# Patient Record
Sex: MALE | Race: WHITE | NOT HISPANIC OR LATINO | Employment: OTHER | URBAN - METROPOLITAN AREA
[De-identification: names, ages, dates, MRNs, and addresses within clinical notes are randomized per-mention and may not be internally consistent; named-entity substitution may affect disease eponyms.]

---

## 2023-02-23 LAB — PROSTATE SPECIFIC ANTIGEN,SCREEN: 5.53 NG/ML (ref 0–4)

## 2023-03-15 LAB — URINE CULTURE: NO GROWTH

## 2023-04-14 LAB — URINE CULTURE: NORMAL

## 2023-04-28 LAB
ANION GAP IN SER/PLAS: 13 MMOL/L (ref 10–20)
CALCIUM (MG/DL) IN SER/PLAS: 9.7 MG/DL (ref 8.6–10.6)
CARBON DIOXIDE, TOTAL (MMOL/L) IN SER/PLAS: 26 MMOL/L (ref 21–32)
CHLORIDE (MMOL/L) IN SER/PLAS: 102 MMOL/L (ref 98–107)
CREATININE (MG/DL) IN SER/PLAS: 0.99 MG/DL (ref 0.5–1.3)
ERYTHROCYTE DISTRIBUTION WIDTH (RATIO) BY AUTOMATED COUNT: 13.2 % (ref 11.5–14.5)
ERYTHROCYTE MEAN CORPUSCULAR HEMOGLOBIN CONCENTRATION (G/DL) BY AUTOMATED: 33 G/DL (ref 32–36)
ERYTHROCYTE MEAN CORPUSCULAR VOLUME (FL) BY AUTOMATED COUNT: 87 FL (ref 80–100)
ERYTHROCYTES (10*6/UL) IN BLOOD BY AUTOMATED COUNT: 5.55 X10E12/L (ref 4.5–5.9)
GFR MALE: 87 ML/MIN/1.73M2
GLUCOSE (MG/DL) IN SER/PLAS: 112 MG/DL (ref 74–99)
HEMATOCRIT (%) IN BLOOD BY AUTOMATED COUNT: 48.2 % (ref 41–52)
HEMOGLOBIN (G/DL) IN BLOOD: 15.9 G/DL (ref 13.5–17.5)
INR IN PPP BY COAGULATION ASSAY: 1 (ref 0.9–1.1)
LEUKOCYTES (10*3/UL) IN BLOOD BY AUTOMATED COUNT: 5.4 X10E9/L (ref 4.4–11.3)
NRBC (PER 100 WBCS) BY AUTOMATED COUNT: 0 /100 WBC (ref 0–0)
PLATELETS (10*3/UL) IN BLOOD AUTOMATED COUNT: 193 X10E9/L (ref 150–450)
POTASSIUM (MMOL/L) IN SER/PLAS: 4.4 MMOL/L (ref 3.5–5.3)
PROTHROMBIN TIME (PT) IN PPP BY COAGULATION ASSAY: 11.2 SEC (ref 9.8–13.4)
SODIUM (MMOL/L) IN SER/PLAS: 137 MMOL/L (ref 136–145)
UREA NITROGEN (MG/DL) IN SER/PLAS: 12 MG/DL (ref 6–23)

## 2023-09-18 LAB
ANION GAP IN SER/PLAS: 15 MMOL/L (ref 10–20)
CALCIUM (MG/DL) IN SER/PLAS: 10.2 MG/DL (ref 8.6–10.6)
CARBON DIOXIDE, TOTAL (MMOL/L) IN SER/PLAS: 27 MMOL/L (ref 21–32)
CHLORIDE (MMOL/L) IN SER/PLAS: 103 MMOL/L (ref 98–107)
CREATININE (MG/DL) IN SER/PLAS: 0.93 MG/DL (ref 0.5–1.3)
ERYTHROCYTE DISTRIBUTION WIDTH (RATIO) BY AUTOMATED COUNT: 13 % (ref 11.5–14.5)
ERYTHROCYTE MEAN CORPUSCULAR HEMOGLOBIN CONCENTRATION (G/DL) BY AUTOMATED: 32.8 G/DL (ref 32–36)
ERYTHROCYTE MEAN CORPUSCULAR VOLUME (FL) BY AUTOMATED COUNT: 88 FL (ref 80–100)
ERYTHROCYTES (10*6/UL) IN BLOOD BY AUTOMATED COUNT: 5.36 X10E12/L (ref 4.5–5.9)
GFR MALE: >90 ML/MIN/1.73M2
GLUCOSE (MG/DL) IN SER/PLAS: 104 MG/DL (ref 74–99)
HEMATOCRIT (%) IN BLOOD BY AUTOMATED COUNT: 47 % (ref 41–52)
HEMOGLOBIN (G/DL) IN BLOOD: 15.4 G/DL (ref 13.5–17.5)
LEUKOCYTES (10*3/UL) IN BLOOD BY AUTOMATED COUNT: 6.1 X10E9/L (ref 4.4–11.3)
NRBC (PER 100 WBCS) BY AUTOMATED COUNT: 0 /100 WBC (ref 0–0)
PLATELETS (10*3/UL) IN BLOOD AUTOMATED COUNT: 199 X10E9/L (ref 150–450)
POTASSIUM (MMOL/L) IN SER/PLAS: 4.5 MMOL/L (ref 3.5–5.3)
SODIUM (MMOL/L) IN SER/PLAS: 140 MMOL/L (ref 136–145)
UREA NITROGEN (MG/DL) IN SER/PLAS: 14 MG/DL (ref 6–23)

## 2023-09-26 ENCOUNTER — HOSPITAL ENCOUNTER (OUTPATIENT)
Dept: DATA CONVERSION | Facility: HOSPITAL | Age: 63
End: 2023-09-27
Attending: STUDENT IN AN ORGANIZED HEALTH CARE EDUCATION/TRAINING PROGRAM | Admitting: STUDENT IN AN ORGANIZED HEALTH CARE EDUCATION/TRAINING PROGRAM
Payer: COMMERCIAL

## 2023-09-26 DIAGNOSIS — C61 MALIGNANT NEOPLASM OF PROSTATE (MULTI): ICD-10-CM

## 2023-09-27 LAB
ANION GAP IN SER/PLAS: 12 MMOL/L (ref 10–20)
CALCIUM (MG/DL) IN SER/PLAS: 8.6 MG/DL (ref 8.6–10.6)
CARBON DIOXIDE, TOTAL (MMOL/L) IN SER/PLAS: 27 MMOL/L (ref 21–32)
CHLORIDE (MMOL/L) IN SER/PLAS: 104 MMOL/L (ref 98–107)
CREATININE (MG/DL) IN SER/PLAS: 0.97 MG/DL (ref 0.5–1.3)
ERYTHROCYTE DISTRIBUTION WIDTH (RATIO) BY AUTOMATED COUNT: 13 % (ref 11.5–14.5)
ERYTHROCYTE DISTRIBUTION WIDTH (RATIO) BY AUTOMATED COUNT: NORMAL
ERYTHROCYTE MEAN CORPUSCULAR HEMOGLOBIN CONCENTRATION (G/DL) BY AUTOMATED: 34.4 G/DL (ref 32–36)
ERYTHROCYTE MEAN CORPUSCULAR HEMOGLOBIN CONCENTRATION (G/DL) BY AUTOMATED: NORMAL
ERYTHROCYTE MEAN CORPUSCULAR VOLUME (FL) BY AUTOMATED COUNT: 86 FL (ref 80–100)
ERYTHROCYTE MEAN CORPUSCULAR VOLUME (FL) BY AUTOMATED COUNT: NORMAL
ERYTHROCYTES (10*6/UL) IN BLOOD BY AUTOMATED COUNT: 4.68 X10E12/L (ref 4.5–5.9)
ERYTHROCYTES (10*6/UL) IN BLOOD BY AUTOMATED COUNT: NORMAL
GFR MALE: 89 ML/MIN/1.73M2
GLUCOSE (MG/DL) IN SER/PLAS: 99 MG/DL (ref 74–99)
HEMATOCRIT (%) IN BLOOD BY AUTOMATED COUNT: 40.1 % (ref 41–52)
HEMATOCRIT (%) IN BLOOD BY AUTOMATED COUNT: NORMAL
HEMOGLOBIN (G/DL) IN BLOOD: 13.8 G/DL (ref 13.5–17.5)
HEMOGLOBIN (G/DL) IN BLOOD: NORMAL
LEUKOCYTES (10*3/UL) IN BLOOD BY AUTOMATED COUNT: 11.1 X10E9/L (ref 4.4–11.3)
LEUKOCYTES (10*3/UL) IN BLOOD BY AUTOMATED COUNT: NORMAL
NRBC (PER 100 WBCS) BY AUTOMATED COUNT: 0 /100 WBC (ref 0–0)
NRBC (PER 100 WBCS) BY AUTOMATED COUNT: NORMAL
PLATELETS (10*3/UL) IN BLOOD AUTOMATED COUNT: 199 X10E9/L (ref 150–450)
PLATELETS (10*3/UL) IN BLOOD AUTOMATED COUNT: NORMAL
POTASSIUM (MMOL/L) IN SER/PLAS: 4 MMOL/L (ref 3.5–5.3)
SODIUM (MMOL/L) IN SER/PLAS: 139 MMOL/L (ref 136–145)
UREA NITROGEN (MG/DL) IN SER/PLAS: 12 MG/DL (ref 6–23)

## 2023-09-28 PROBLEM — S83.289A TEAR OF LATERAL MENISCUS OF KNEE: Status: ACTIVE | Noted: 2023-09-28

## 2023-09-28 PROBLEM — E78.5 DYSLIPIDEMIA: Status: ACTIVE | Noted: 2023-09-28

## 2023-09-28 PROBLEM — N45.1 EPIDIDYMITIS: Status: ACTIVE | Noted: 2023-09-28

## 2023-09-28 PROBLEM — M23.007 CYST OF MENISCUS OF LEFT KNEE: Status: ACTIVE | Noted: 2023-09-28

## 2023-09-28 PROBLEM — R97.20 ELEVATED PSA: Status: ACTIVE | Noted: 2023-09-28

## 2023-09-28 PROBLEM — M76.32 ILIOTIBIAL BAND TENDINITIS OF LEFT SIDE: Status: ACTIVE | Noted: 2023-09-28

## 2023-09-28 PROBLEM — I10 ESSENTIAL HYPERTENSION, BENIGN: Status: ACTIVE | Noted: 2023-09-28

## 2023-09-28 PROBLEM — S76.312A LEFT HAMSTRING MUSCLE STRAIN: Status: ACTIVE | Noted: 2023-09-28

## 2023-09-28 RX ORDER — TADALAFIL 5 MG/1
5 TABLET ORAL DAILY
COMMUNITY
End: 2023-10-03 | Stop reason: DRUGHIGH

## 2023-09-28 RX ORDER — AMLODIPINE BESYLATE 5 MG/1
1 TABLET ORAL DAILY
COMMUNITY
Start: 2022-02-07 | End: 2024-01-26 | Stop reason: ALTCHOICE

## 2023-09-28 RX ORDER — LOSARTAN POTASSIUM 25 MG/1
25 TABLET ORAL DAILY
COMMUNITY
Start: 2023-07-18 | End: 2024-01-26

## 2023-09-28 RX ORDER — CHLORTHALIDONE 25 MG/1
1 TABLET ORAL DAILY
COMMUNITY
Start: 2021-12-20 | End: 2024-01-26 | Stop reason: ALTCHOICE

## 2023-09-28 RX ORDER — EPINEPHRINE 0.3 MG/.3ML
0.3 INJECTION SUBCUTANEOUS ONCE AS NEEDED
COMMUNITY
Start: 2019-09-03

## 2023-09-29 VITALS — BODY MASS INDEX: 27.99 KG/M2 | HEIGHT: 71 IN | WEIGHT: 199.96 LBS

## 2023-09-30 NOTE — DISCHARGE SUMMARY
Send Summary:   Discharge Summary Providers:  Provider Role Provider Name   · Referring Cheryl Richter   · Attending Vinicio Robles   · Primary Fabio Marquez       Note Recipients: Fabio Marquez MD - [id number] [preferred]  Vinicio Robles MD       Discharge:    Summary:   Admission Date: .26-Sep-2023 05:34:00   Discharge Date: 27-Sep-2023   Admission Reason: Adenocarcinoma of prostate   Final Discharge Diagnoses: Adenocarcinoma of prostate   Procedures: Date: 26-Sep-2023 13:15:00  Procedure Name: 1. robotic assisted laparoscopic radical prostatectomy  2. bilateral pelvic lymph node dissection  3. laparoscopic urethral suspension   Vital Signs:        T   P  R  BP   MAP  SpO2   Value  37.3  88  18  131/72      95%  Date/Time 9/26 21:42 9/26 21:42 9/26 21:42 9/26 21:42    9/26 21:42  Range  (36.7C - 37.3C )  (75 - 88 )  (18 - 18 )  (131 - 132 )/ (72 - 75 )    (94% - 95% )  Highest temp of 37.3 C was recorded at 9/26 21:42    Date:            Weight/Scale Type:  Height:   26-Sep-2023 18:49  90.7  kg / standing  180.1  cm  Physical Exam:    Physical exam:  Constitutional: Alert, NAD  HEENT: normocephalic, atraumatic EOMI  Neuro: A&O x3  CV: rate is regular  Pulm: Non-laboured breathing on room air  GI: abdomen soft, non-tender, non-distended  : Tony in place draining clear yellow urine  Skin: Port incisions with dermabond and steris c/d/i. No lesions or discoloration noted.  Musculoskeletal GALEN  Extremities: no edema or cyanosis noted    Hospital Course:    TORIE PENA is a 63 year old male with a significant pmh of HTN, HLD, mild JIMMY (not on CPAP), right inguinal hernia, hx of UTI and prostatitis who was  referred by Dr. Walker for elevated PSA (5.53 2/23/23) and was diagnosed with 0.9cm PIRADS4 lesion in the right posterolateral peripheral zone apex, without evidence of extraprostatic extension on 4/3 MRI. 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 6/16 PET CT  demonstrated increased expression within the apical prostate gland consistent with MRI, without evidence for pelvic lymph node or distant metastatic disease. IPSS 10/35, JIGNESH 25/25. He is now s/p RALP with BPLND with Dr. Robles on 9/26. The procedure was  well tolerated and findings were negative intra-op leak test. The patient is currently POD #1 and has had an uncomplicated surgical course. VS remained stable. Patient voided xL clear yellow urine per sheets. Labs were significant for X. ROSHAN output was  serosanguinous and <10 cc/hr for 6hrs, thus ROSHAN drain was removed. At the time of hospital discharge he was tolerating a regular diet, ambulating independently, and pain was well-controlled. The patient worked with the nursing team to attain a baseline  proficiency in sheets catheter care. Patient was not found to have impaired mobility, so no PT eval or home services were determined to be required.  Recommendations for discharge included maintaining sheets catheter until follow up office visit for TOV  on 10/3 in Urology clinic. He was discharged to home in satisfactory condition and will follow up with Dr. Robles in the outpatient clinic on 11/16.      Discharge Information:    and Continuing Care:   Lab Results - Pending:    Complete Blood Count Drawn at 27-Sep-2023 10:50:00  Surgical Pathology Drawn at 26-Sep-2023 12:13:00  Surgical Pathology Drawn at 26-Sep-2023 09:47:00  Radiology Results - Pending: None   Discharge Instructions:    Activity:           Do not drive or operate heavy machinery while taking narcotic medication or if you have discomfort that impairs your ability to make sudden movements          After discharge from the hospital you can slowly resume your activities of daily living          You  "should refrain from lifting more than 15 lbs and doing strenuous activities for 6 weeks          It is ok to shower, but avoid tubsoaks or any prolonged submersion in water          You may let soapy water run over your incisions or any surgical drains left in place at discharge but avoid vigorous scrubbing          Soap and water may also be used to clean the outside of the catheter    Nutrition/Diet:           You may resume a normal diet and make sure to drink plenty of fluids    Labs:           Other Labs:   PSA level - please have this lab work drawn 1-2 days before your follow up with Dr Robles. The order for this test is in the  [facility] - please have this blood work obtained at any  facility.    Additional Orders:           Additional Instructions:   If you have any urgent issues in your immediate post-operative period, call [phone] between 7am and 3:30pm, Monday-Friday (except holidays) to reach the inpatient Urology NP direct voicemail. If there is no response  within an hour, call [phone].     Recommend getting Miralax (polyethylene glycol) over the counter at pharmacy for assistance in return of bowel function. This is safe to use daily and you can begin tonight after discharge.  To assist with irritation of the catheter insertion site, you may apply bacitracin/Neosporin ointment frequently throughout the day. This provides a \"cushion.\"   Expect the catheter to leak - remember, the urethra has a tube keeping it open - and urine will flow down the path of least resistance, especially when engaging your abdominal muscles to move or have a bowel movement.         Discharge Medications: Home Medication   oxyCODONE 5 mg oral tablet - 1 tab(s) orally every 6 hours x 3 days  as needed for pain G89.18    12 tablets dispensed  MiraLax oral powder for reconstitution - 17 gram(s) orally once a day as needed to prevent constipation while taking narcotics  senna 8.6 mg oral tablet - 1 tab(s) orally once a day " to prevent constipation while taking pain medication  losartan 25 mg oral tablet - 1 tab(s) oral once a day     PRN Medication     DNR Status:   ·  Code Status Code Status order at time of discharge: Full Code       Electronic Signatures:  Leesa Florian (PAC)  (Signed 27-Sep-2023 00:40)   Authored: Send Summary, Summary Content, Ongoing Care,  DNR Status  Linda Martinez (APRN-CNP)  (Signed 27-Sep-2023 11:12)   Authored: Summary Content, Ongoing Care, Note Completion      Last Updated: 27-Sep-2023 11:12 by Linda Martinez (APRN-CNP)

## 2023-09-30 NOTE — H&P
History & Physical Reviewed:   I have reviewed the History and Physical dated:  18-Sep-2023   History and Physical reviewed and relevant findings noted. Patient examined to review pertinent physical  findings.: No significant changes   Home Medications Reviewed: no changes noted   Allergies Reviewed: no changes noted       ERAS (Enhanced Recovery After Surgery):  ·  ERAS Patient: no     Consent:   COVID-19 Consent:  ·  COVID-19 Risk Consent Surgeon has reviewed key risks related to the risk of ranjana COVID-19 and if they contract COVID-19 what the risks are.     Attestation:   Note Completion:  I am a:  Resident/Fellow   Attending Attestation I reviewed the resident/fellow?s documentation and discussed the patient with the resident/fellow.  I agree with the resident/fellow?s medical  decision making as documented in the note.          Electronic Signatures:  Vinicio Robles)  (Signed 27-Sep-2023 08:07)   Authored: Note Completion   Co-Signer: History & Physical Reviewed, ERAS, Consent, Note Completion  Malaika David (Resident))  (Signed 25-Sep-2023 19:52)   Authored: History & Physical Reviewed, ERAS, Consent,  Note Completion      Last Updated: 27-Sep-2023 08:07 by Vinicio Robles)

## 2023-10-01 NOTE — OP NOTE
Post Operative Note:     PreOp Diagnosis: prostate cancer   Post-Procedure Diagnosis: same   Procedure: 1. robotic assisted laparoscopic radical  prostatectomy  2. bilateral pelvic lymph node dissection  3. laparoscopic urethral suspension   Surgeon: Margaret   Resident/Fellow/Other Assistant: Lele David   Anesthesia: general   I.V. Fluids: per anesthesia   Estimated Blood Loss (mL): 200cc   Blood Replacement: none   Specimen: yes. prostate, seminal vesicles, pelvic  lymph nodes   Complications: none   Findings: small prostate, bilateral nerve sparing   Patient Returned To/Condition: PACU in stable condition   Urine Output: lost to field   Drains and/or Catheters: sheets catheter, ROSHAN drain     Operative Report Dictated:  Dictation: not applicable - note contains Operative  Report   Operative Report:    Operative Indications:  This is a 63yoM with GG3 prostate cancer who presents for robotic prostatectomy with BLLPLND with Dr. Robles. Risks, benefits, and alternatives were discussed in the preoperative setting and consent was obtained.    Operative Procedure:  The patient was brought to the operating room and placed in the supine position. Under general endotracheal anesthesia the abdominal region and groin areas were draped and prepped in the usual sterile fashion. IV antibiotics were administered. The patient  was placed in 30 degree trendelenburg position. A 16fr 10cc sheets catheter was placed  An 8mm incision was made just above the umbilicus. This was carried out through the skin and subcutaneous tissues. A veress needle was used and placed into the abdominal  cavity. The abdomen was then insufflated with CO2 to a total pressure of 15mm. At this point an 8mm trocar was placed into the abdominal cavity. The Davinci camera with 0 degree lens was placed and the abdominal cavity was examined and there were noted  to be not bowel or vascular injuries.   At this point, 3 Davinci trocars, a dilating blunt Ethicon 10/12  trocar cristina 5mm airseal were placed, totaling 6 ports in all and the TapPressinci robot was brought into place and docked securely.   Beginning with a 0 degree lens, I began by incising the median umbilical ligaments up on the anterior abdominal wall. This allowed the bladder to fall posteriorly and the space of retzius was entered. The adipose tissue was cleared overlying the endopelvic  fascia.using only sharp dissection with no electrocautery I opened the endopelvic fascia. Identification of the apex of the prostate was accomplished in this manner. The levator muscles were swept away posterolaterally and the superficial dorsal vein  was cauterized using the biopolar cautery.   We then switched to a 30-degree down lens and identified the bladder neck. Using the cautery  I divided the anterior bladder neck. I then inspected the interior aspect of the bladder and identified the UO's which were well away from our dissection and  confirmed the absence of any median lobe. The left sided assistant lifted up on the sheets catheter. I then divided the posterior bladder neck. Deep to this I then identified both vasa and seminal vesicles. The vasa were both divided and both SV's were  dissected down to their tips. Minimal cautery was utilized here to avoid injury to the NVB's at the tip of the SV's. With anterior traction on the vas and SV's  I divided the posterior layer of Denonviller's fascia and identified the yellow perirectal  fat. This allowed us to develop a plane between the prostate and rectum. In doing so, both pedicles of the prostate were identified. the pedicles were then divided and nerve sparin performed using a 30 degree up lens from beneath. The lateral prostatic  fascia was incised  and the neurovascular bundles were allowed to fall posterolaterally and a bilateral nerve sparing procedure was performed. there was one area on the r side i was uncertain about and frozen section was sent which returned negative.   Small pedicle attachments were then divided all the way from the base to the apex of the prostate. The NVB was also released at the level of the urethra and prostatic apex. The only remaining attachments at this point were the DVC and the urethra. The  DVC was then divided identifying a nice healthy urethra beneath. Using the scissors, the urethra was divided and the prostate was free. The DVC was oversewn with 0 Vicryl. The specimen was placed in an endocatch bag and tucked away for the remaining portion  of the case. The bladder neck was then reconstructed using a posterior tennis racquet approach using 0-vicryl  At this point a bilateral pelvic lymph node dissection was carried out in the standard fashion. Borders of dissection were the iliac vein anteriorly, obturator nerve posteriorly, bifurcation of iliac superiorly and coopers ligament inferiorly. Both obturator  nerves were identified and preserved. Both lymphatic packages appeared grossly benign and were sent off to pathology as a permanent specimen.  At this time, 2 needle drivers were placed and a Vicente stitch used to bring the bladder towards the urethra. Using a double armed 2.0 monocryl, the urethrovesical anastomosis was performed in a running fashion. An 18Fr sheets catheter was placed and irrigated  and there was no extravasation indicating a water tight anastomosis. The urethra was then suspended to the pubic symphysis using this stitch and hem-o-loc clips.  T A #15 round ROSHAN drain was placed into the pelvis and brought out through the left sided 5mm port and secured in place with a silk drain stich. The INPHIinci robot was then undocked and removed. All trocars were removed under direct vision and the specimen  which had been bagged previously was removed through the camera port. The fascia at this site was closed with 0-PDS and all skin incisions were closed with a 4.0 biosyn, dermabond and steri strips. 0.25% plain marcaine was instilled for local  anesthesia.  Sterile dressings were applied and the ROSHAN drain was placed to bulb suction. The patient was extubated in the OR and transferred to the recovery room with stable vital signs.      Attestation:   Note Completion:  I am a: Resident/Fellow   Attending Attestation I was present for the entire procedure          Electronic Signatures:  Vinicio Robles)  (Signed 27-Sep-2023 08:09)   Authored: Post Operative Note, Note Completion   Co-Signer: Post Operative Note, Note Completion  Malaika David (Resident))  (Signed 26-Sep-2023 13:20)   Authored: Post Operative Note, Note Completion      Last Updated: 27-Sep-2023 08:09 by Vinicio Robles)

## 2023-10-03 ENCOUNTER — OFFICE VISIT (OUTPATIENT)
Dept: UROLOGY | Facility: CLINIC | Age: 63
End: 2023-10-03
Payer: COMMERCIAL

## 2023-10-03 VITALS — DIASTOLIC BLOOD PRESSURE: 86 MMHG | TEMPERATURE: 97.1 F | SYSTOLIC BLOOD PRESSURE: 133 MMHG | HEART RATE: 80 BPM

## 2023-10-03 DIAGNOSIS — C61 PROSTATE CANCER (MULTI): Primary | ICD-10-CM

## 2023-10-03 PROCEDURE — 3079F DIAST BP 80-89 MM HG: CPT | Performed by: NURSE PRACTITIONER

## 2023-10-03 PROCEDURE — 3075F SYST BP GE 130 - 139MM HG: CPT | Performed by: NURSE PRACTITIONER

## 2023-10-03 PROCEDURE — 99024 POSTOP FOLLOW-UP VISIT: CPT | Performed by: NURSE PRACTITIONER

## 2023-10-03 RX ORDER — TADALAFIL 5 MG/1
5 TABLET ORAL DAILY
Qty: 10 TABLET | Refills: 0 | Status: SHIPPED | OUTPATIENT
Start: 2023-10-03 | End: 2024-05-14 | Stop reason: WASHOUT

## 2023-10-04 LAB
COMPLETE PATHOLOGY REPORT: NORMAL
CONVERTED CLINICAL DIAGNOSIS-HISTORY: NORMAL
CONVERTED FINAL DIAGNOSIS: NORMAL
CONVERTED FINAL REPORT PDF LINK TO COPY AND PASTE: NORMAL
CONVERTED GROSS DESCRIPTION: NORMAL
CONVERTED INTRAOPERATIVE DIAGNOSIS: NORMAL
CONVERTED PHYSICIAN NOTIFICATION: NORMAL
CONVERTED SYNOPTIC DIAGNOSIS: NORMAL

## 2023-10-05 DIAGNOSIS — C61 PROSTATE CANCER (MULTI): ICD-10-CM

## 2023-10-05 RX ORDER — TADALAFIL 5 MG/1
TABLET ORAL
Qty: 90 TABLET | Refills: 3 | Status: SHIPPED | OUTPATIENT
Start: 2023-10-05 | End: 2024-05-14 | Stop reason: SDUPTHER

## 2023-10-15 ASSESSMENT — ENCOUNTER SYMPTOMS
SLEEP DISTURBANCE: 0
HEMATURIA: 0
CHILLS: 0
CONSTIPATION: 0
FEVER: 0

## 2023-10-15 NOTE — PROGRESS NOTES
UROLOGIC FOLLOW-UP VISIT     PROBLEM LIST:  1. Prostate cancer (CMS/HCC)  tadalafil (Cialis) 5 mg tablet           HISTORY OF PRESENT ILLNESS:   Buddy Izquierdo is a 63 y.o. with prostate cancer s/p RALP with Dr. Robles 9/26/23.    INTERVAL HISTORY:  Returns today for TOV  Accompanied by spouse  No post-op issues  No fever or chills    PAST MEDICAL HISTORY:  Past Medical History:   Diagnosis Date    Bee allergy status 09/03/2019    History of bee sting allergy    Dorsalgia, unspecified 07/22/2015    Back pain, acute    Encounter for general adult medical examination without abnormal findings 12/18/2017    Blood tests for routine general physical examination    Encounter for immunization     Encounter for immunization    Encounter for other orthopedic aftercare 10/26/2020    Orthopedic aftercare    Encounter for screening for malignant neoplasm of colon 09/09/2021    Colon cancer screening    Encounter for screening for malignant neoplasm of prostate 12/17/2017    Prostate cancer screening    Encounter for screening for other viral diseases 12/17/2017    Need for hepatitis C screening test    Iliotibial band syndrome, left leg 12/28/2020    Iliotibial band tendinitis of left side    Other tear of lateral meniscus, current injury, left knee, initial encounter 12/14/2020    Tear of lateral meniscus of left knee, unspecified tear type, unspecified whether old or current tear, initial encounter    Pain in left hip 08/10/2018    Left hip pain    Pain in left knee 10/06/2020    Left knee pain    Pain in left knee 09/27/2021    Left knee pain    Pain in left shoulder 08/28/2014    Left shoulder pain    Pain in unspecified ankle and joints of unspecified foot 12/18/2017    Ankle pain    Pain in unspecified elbow 12/18/2017    Elbow pain    Personal history of other diseases of male genital organs 12/18/2017    History of epididymitis    Personal history of other diseases of the respiratory system 11/25/2020    History of  upper respiratory infection    Strain of muscle, fascia and tendon of the posterior muscle group at thigh level, left thigh, initial encounter 08/22/2018    Left hamstring muscle strain, initial encounter    Unspecified abdominal pain 07/22/2015    Abdominal cramping    Unspecified dislocation of unspecified acromioclavicular joint, initial encounter 08/29/2014    AC separation       PAST SURGICAL HISTORY:  No past surgical history on file.     ALLERGIES:   No Known Allergies     MEDICATIONS:   Current Outpatient Medications on File Prior to Visit   Medication Sig Dispense Refill    amLODIPine (Norvasc) 5 mg tablet Take 1 tablet (5 mg) by mouth once daily.      chlorthalidone (Hygroton) 25 mg tablet Take 1 tablet (25 mg) by mouth once daily.      EPINEPHrine 0.3 mg/0.3 mL injection syringe Inject 0.3 mL (0.3 mg) into the shoulder, thigh, or buttocks 1 time if needed. As Directed      losartan (Cozaar) 25 mg tablet Take 1 tablet (25 mg) by mouth once daily.       No current facility-administered medications on file prior to visit.        SOCIAL HISTORY:  Patient     Social History     Socioeconomic History    Marital status:      Spouse name: Not on file    Number of children: Not on file    Years of education: Not on file    Highest education level: Not on file   Occupational History    Not on file   Tobacco Use    Smoking status: Not on file    Smokeless tobacco: Not on file   Substance and Sexual Activity    Alcohol use: Not on file    Drug use: Not on file    Sexual activity: Not on file   Other Topics Concern    Not on file   Social History Narrative    Not on file     Social Determinants of Health     Financial Resource Strain: Not on file   Food Insecurity: Not on file   Transportation Needs: Not on file   Physical Activity: Not on file   Stress: Not on file   Social Connections: Not on file   Intimate Partner Violence: Not on file   Housing Stability: Not on file       FAMILY HISTORY:  Family History  "  Problem Relation Name Age of Onset    Cancer Other      Diabetes Other      Hypertension Other      Heart disease Other         REVIEW OF SYSTEMS:  Review of Systems   Constitutional:  Negative for chills and fever.   Gastrointestinal:  Negative for constipation.   Genitourinary:  Negative for hematuria.   Psychiatric/Behavioral:  Negative for sleep disturbance.        PHYSICAL EXAM  Visit Vitals  /86   Pulse 80   Temp 36.2 °C (97.1 °F)     Constitutional: Patient appears well-developed and well-nourished. No distress.    Head: Normocephalic and atraumatic.    Neck: Normal range of motion.    Cardiovascular: Normal rate.    Pulmonary/Chest: Effort normal. No respiratory distress.   Abdominal: Nondistended  : See below  Musculoskeletal: Normal range of motion.    Neurological: Alert and oriented to person, place, and time.  Psychiatric: Normal mood and affect. Behavior is normal. Thought content normal.      LABORATORY REVIEW:   Lab Results   Component Value Date    BUN 12 09/27/2023    CREATININE 0.97 09/27/2023     09/27/2023    K 4.0 09/27/2023     09/27/2023    CO2 27 09/27/2023    CALCIUM 8.6 09/27/2023      Lab Results   Component Value Date    WBC 11.1 09/27/2023    RBC 4.68 09/27/2023    HGB 13.8 09/27/2023    HCT 40.1 (L) 09/27/2023    MCV 86 09/27/2023    MCHC 34.4 09/27/2023    RDW 13.0 09/27/2023     09/27/2023        No results found for: \"PSA\"      Assessment:      1. Prostate cancer (CMS/[facility])  tadalafil (Cialis) 5 mg tablet          Buddy Izquierdo is a 63 y.o. with prostate cancer s/p RALP with Dr. Robles 9/26/23. Successful TOV.     Plan:   Start tadalafil 5 mg po daily  Reviewed post-op expectations and red flags  RTC with Dr. Robles as scheduled  Encouraged to call in the interim with any questions, concerns       "

## 2023-11-02 DIAGNOSIS — N52.31 ERECTILE DYSFUNCTION AFTER RADICAL PROSTATECTOMY: Primary | ICD-10-CM

## 2023-11-02 RX ORDER — SILDENAFIL 25 MG/1
25 TABLET, FILM COATED ORAL DAILY
Qty: 90 TABLET | Refills: 3 | Status: SHIPPED | OUTPATIENT
Start: 2023-11-02 | End: 2024-10-27

## 2023-11-16 ENCOUNTER — OFFICE VISIT (OUTPATIENT)
Dept: UROLOGY | Facility: CLINIC | Age: 63
End: 2023-11-16
Payer: COMMERCIAL

## 2023-11-16 ENCOUNTER — LAB (OUTPATIENT)
Dept: LAB | Facility: LAB | Age: 63
End: 2023-11-16
Payer: COMMERCIAL

## 2023-11-16 VITALS — TEMPERATURE: 98 F | WEIGHT: 200.8 LBS | BODY MASS INDEX: 28.11 KG/M2 | HEIGHT: 71 IN

## 2023-11-16 DIAGNOSIS — C61 MALIGNANT NEOPLASM OF PROSTATE (MULTI): Primary | ICD-10-CM

## 2023-11-16 LAB — PSA SERPL-MCNC: <0.1 NG/ML

## 2023-11-16 PROCEDURE — 84153 ASSAY OF PSA TOTAL: CPT

## 2023-11-16 PROCEDURE — 99213 OFFICE O/P EST LOW 20 MIN: CPT | Performed by: STUDENT IN AN ORGANIZED HEALTH CARE EDUCATION/TRAINING PROGRAM

## 2023-11-16 PROCEDURE — 36415 COLL VENOUS BLD VENIPUNCTURE: CPT

## 2023-11-16 PROCEDURE — 1036F TOBACCO NON-USER: CPT | Performed by: STUDENT IN AN ORGANIZED HEALTH CARE EDUCATION/TRAINING PROGRAM

## 2023-11-16 ASSESSMENT — PAIN SCALES - GENERAL: PAINLEVEL: 0-NO PAIN

## 2023-11-16 NOTE — PROGRESS NOTES
HPI:  Proc (5/16/23): TP prostate biopsy   Path: prostatic adenocarcinoma, GG3 (Neil 4+3=7), 2/2 cores (65% of tissue), LEEANNA #1 GG2 (Eastern 3+4=7), 4/5 cores (80% of tissue), pattern 4, ASAP, perineural invasion, HGPIN    Proc (9/26/23): robotic assisted laparoscopic prostatectomy with bilateral pelvic lymph node dissection   Path: prostatic adenocarcinoma, GG2 (Neil score 3+4=7), 6-10% pattern 4, lymph nodes neg     63 year old male referred by Dr. Walker for elevated PSA. Hx of prostatitis, HTN, UTIs. PSA 5.53 (2/23/23). MRI Prostate (4/3/23) showed PI-RADS 4 lesion in the right posterolateral PZ prostate apex, without evidence of extraprostatic extension. S/p TP prostate biopsy (5/16/23) with pathology showing prostatic adenocarcinoma, GG3 (Neil 4+3=7), 2/2 cores (65% of tissue), LEEANNA #1 GG2 (Neil 3+4=7), 4/5 cores (80% of tissue), pattern 4, ASAP, perineural invasion, HGPIN. S/p biopsy hematuria. PSMA/PET (6/16/23) showed heterogenous Ga68 PSMA uptake throughout the prostate gland with focally increased Ga68 PSMA expression within the apical prostate gland consistent with biopsy-proven prostate cancer, no evidence for pelvic lymph node metastasis, no evidence for distant metastatic disease including osseous metastases. S/p robotic assisted laparoscopic prostatectomy with bilateral pelvic lymph node dissection (9/26/23) with pathology showing prostatic adenocarcinoma, GG2 (Neil score 3+4=7), 6-10% pattern 4, lymph nodes neg. Denies TAMAR, not wearing pad or diaper. ED unable to obtain erections with Cialis. Doing well.       PSA: 5.53 (2/23/23)     MRI Prostate (4/3/23): 18.5g  PI-RADS 4 lesion in the right posterolateral PZ prostate apex, without evidence of extraprostatic extension.     PSMA/PET (6/16/23): heterogenous Ga68 PSMA uptake throughout the prostate gland with focally increased Ga68 PSMA expression within the apical prostate gland consistent with biopsy-proven prostate cancer, no evidence  for pelvic lymph node metastasis, no evidence for distant metastatic disease including osseous metastases.    Review of Systems:  All systems reviewed. Anything negative noted in the HPI.    Physical Exam:  Vitals signs reviewed.  Constitutional:      Appearance: Well-developed.  HENT:     Head: Normocephalic and atraumatic.  Neck:     Musculoskeletal: Normal range of motion.  Pulmonary:     Effort: Pulmonary effort is normal.  Musculoskeletal: Normal range of motion.  Skin:     General: Skin is warm and dry.  Neurological:     Mental Status: Alert and oriented to person, place, and time.  Psychiatric:        Behavior: Behavior normal.        Thought Content: Thought content normal.        Judgment: Judgment normal.  Genitourinary:     Penis: Normal.      Scrotum/Testes: Normal.     Comments:  Abdominal:     Palpations: Abdomen is soft. There is no mass.     Tenderness: There is no tenderness. There is no guarding or rebound.     Hernia: No hernia is present.     Comments: Well healed incisions    Assessment/Plan   63 year old male referred by Dr. Walker for elevated PSA. Hx of prostatitis, HTN, UTIs. PSA 5.53 (2/23/23). MRI Prostate (4/3/23) showed PI-RADS 4 lesion in the right posterolateral PZ prostate apex, without evidence of extraprostatic extension. PSMA/PET (6/16/23) showed heterogenous Ga68 PSMA uptake throughout the prostate gland with focally increased Ga68 PSMA expression within the apical prostate gland consistent with biopsy-proven prostate cancer, no evidence for pelvic lymph node metastasis, no evidence for distant metastatic disease including osseous metastases. S/p robotic assisted laparoscopic prostatectomy with bilateral pelvic lymph node dissection (9/26/23) with pathology showing prostatic adenocarcinoma, GG2 (Wilberforce score 3+4=7), 6-10% pattern 4, lymph nodes neg. Denies TAMAR, not wearing pad or diaper. ED unable to obtain erections with Cialis. Doing well. Management options including risks,  benefits and alternatives discussed at length and all questions answered. Patient prefers to proceed with PSA now and then follow-up in 3mos with repeat PSA.             By signing my name below, I, Sara Kirkland, attest that this documentation has been prepared under the direction and in the presence of Dr. Vinicio Robles.  All medical record entries made by the Sharriibsheridan were at my direction and personally dictated by me. I have reviewed the chart and agree that the record accurately reflects my personal performance of the history, physical exam, discussion and plan.

## 2024-01-25 DIAGNOSIS — I10 ESSENTIAL (PRIMARY) HYPERTENSION: ICD-10-CM

## 2024-01-26 ENCOUNTER — OFFICE VISIT (OUTPATIENT)
Dept: PRIMARY CARE | Facility: CLINIC | Age: 64
End: 2024-01-26
Payer: COMMERCIAL

## 2024-01-26 VITALS
BODY MASS INDEX: 28.49 KG/M2 | DIASTOLIC BLOOD PRESSURE: 80 MMHG | HEART RATE: 71 BPM | SYSTOLIC BLOOD PRESSURE: 132 MMHG | RESPIRATION RATE: 16 BRPM | OXYGEN SATURATION: 97 % | HEIGHT: 71 IN | WEIGHT: 203.48 LBS

## 2024-01-26 DIAGNOSIS — I10 ESSENTIAL HYPERTENSION, BENIGN: ICD-10-CM

## 2024-01-26 DIAGNOSIS — Z00.00 ENCOUNTER FOR PREVENTIVE HEALTH EXAMINATION: Primary | ICD-10-CM

## 2024-01-26 PROCEDURE — 1036F TOBACCO NON-USER: CPT | Performed by: INTERNAL MEDICINE

## 2024-01-26 PROCEDURE — 3079F DIAST BP 80-89 MM HG: CPT | Performed by: INTERNAL MEDICINE

## 2024-01-26 PROCEDURE — 99396 PREV VISIT EST AGE 40-64: CPT | Performed by: INTERNAL MEDICINE

## 2024-01-26 PROCEDURE — 3075F SYST BP GE 130 - 139MM HG: CPT | Performed by: INTERNAL MEDICINE

## 2024-01-26 RX ORDER — LOSARTAN POTASSIUM 50 MG/1
50 TABLET ORAL DAILY
Qty: 30 TABLET | Refills: 11 | Status: SHIPPED | OUTPATIENT
Start: 2024-01-26 | End: 2024-02-28 | Stop reason: SDUPTHER

## 2024-01-26 RX ORDER — LOSARTAN POTASSIUM 25 MG/1
25 TABLET ORAL DAILY
Qty: 90 TABLET | Refills: 1 | Status: SHIPPED | OUTPATIENT
Start: 2024-01-26 | End: 2024-01-26 | Stop reason: ALTCHOICE

## 2024-01-26 NOTE — PROGRESS NOTES
"Follow Up Visit    Mr. Izquierdo is a 63-year-old male with history of prostate cancer status post prostatectomy followed by urology hypertension here for physical.  Overall doing well.  Continues to be physically active, diet is average.  He did have meniscal surgery on his left knee 2020, he started to have some difficulty with physical activity such as skiing and mountain biking, gets some pain and swelling of the posterior and lateral knee.  Comes and goes.  He says he has no pain during the exercise but comes the next day.  No hip pain no ankle pain.  No weakness numbness tingling.  No falls.  Otherwise no complaints or concerns.  Doing well.    Family history, social history reviewed.  Does not smoke, no drugs.    ROS:  No unintentional weight gain or weight loss, no fevers no chills, no night sweats.  No fatigue.    No congestion runny nose sore throat.  No ear pain.  No tinnitus.  No change in hearing.  No change in vision.    No neck pain.    No cough no shortness of breath.  No wheezing.    No chest pain shortness of breath palpitations with rest or with activity.  No orthopnea no PND.  No edema.    No abdominal pain, no nausea vomiting diarrhea.  No dark stools.  No dysphagia, no anorexia.  Appetite intact.    No heat or cold intolerance, constipation diarrhea, weight changes.  No polyuria polydipsia.      Besides knee pain as explained in HPI, no joint pain besides occasional aches and pains,  No muscle weakness.    No new rash.    No headache, no change in memory, no change in cognition.  No weakness numbness tingling of extremities.  No difficulty with gait.    No easy bruisability.    No feeling of sadness, loss of interest, anxiety.    No difficulty urinating, no dribbling, no hesitancy.  No testicular pain.    Vitals and exam:Blood pressure 132/80, pulse 71, resp. rate 16, height 1.803 m (5' 11\"), weight 92.3 kg (203 lb 7.8 oz), SpO2 97 %.  Calm coherent well-appearing.    Neck is supple with no " tenderness, thyroid mobile soft with no nodules, oropharynx clear.  Sinuses nontender.    Breathing comfortably, clear to auscultation bilaterally.    Regular rate and rhythm, no murmur gallops or rubs, good distal pulses.  No edema.  No JVD.  No carotid bruit.    Abdomen is soft, nontender, normal bowel sounds.  No ascites.  No hepatosplenomegaly.    Upper and lower extremity joints intact with full active range of motion.  Strength is 5 out of 5 in upper and lower extremities.  Left knee with no tenderness, no effusion, no swelling.  Full active range of motion.    Skin is grossly normal, moist.     Extremity warm, well-perfused, no clubbing or cyanosis.    Coherent, cognition intact, memory intact.  Gait intact.    No cervical, supraclavicular, axillary or inguinal lymphadenopathy.    Assessment plan:  Mr. Izquierdo is a pleasant 63-year-old male here for physical.    Prostate cancer: Status post resection with negative lymph node biopsy.  On  PSA surveillance every 3 months, followed by urology.    Knee pain and swelling: Given his past history of meniscus tear referral to orthopedics.  He is going to make his own appointment.  Exam reassuring.    Hypertension: Home readings 130s over 80s, increase losartan to 50.  Check renal function with next of labs next week.    Health maintenance: Colonoscopy due 2026.  Immunization up-to-date.  Overall has a healthy lifestyle.  Check A1c lipid panel.

## 2024-01-29 ENCOUNTER — APPOINTMENT (OUTPATIENT)
Dept: ORTHOPEDIC SURGERY | Facility: HOSPITAL | Age: 64
End: 2024-01-29
Payer: COMMERCIAL

## 2024-02-12 ENCOUNTER — LAB (OUTPATIENT)
Dept: LAB | Facility: LAB | Age: 64
End: 2024-02-12
Payer: COMMERCIAL

## 2024-02-12 DIAGNOSIS — Z00.00 ENCOUNTER FOR PREVENTIVE HEALTH EXAMINATION: ICD-10-CM

## 2024-02-12 DIAGNOSIS — I10 ESSENTIAL HYPERTENSION, BENIGN: ICD-10-CM

## 2024-02-12 DIAGNOSIS — C61 MALIGNANT NEOPLASM OF PROSTATE (MULTI): ICD-10-CM

## 2024-02-12 LAB
ALBUMIN SERPL BCP-MCNC: 4.1 G/DL (ref 3.4–5)
ANION GAP SERPL CALC-SCNC: 11 MMOL/L (ref 10–20)
BUN SERPL-MCNC: 16 MG/DL (ref 6–23)
CALCIUM SERPL-MCNC: 9.4 MG/DL (ref 8.6–10.6)
CHLORIDE SERPL-SCNC: 104 MMOL/L (ref 98–107)
CHOLEST SERPL-MCNC: 218 MG/DL (ref 0–199)
CHOLESTEROL/HDL RATIO: 5
CO2 SERPL-SCNC: 29 MMOL/L (ref 21–32)
CREAT SERPL-MCNC: 1 MG/DL (ref 0.5–1.3)
EGFRCR SERPLBLD CKD-EPI 2021: 86 ML/MIN/1.73M*2
EST. AVERAGE GLUCOSE BLD GHB EST-MCNC: 111 MG/DL
GLUCOSE SERPL-MCNC: 115 MG/DL (ref 74–99)
HBA1C MFR BLD: 5.5 %
HDLC SERPL-MCNC: 43.8 MG/DL
LDLC SERPL CALC-MCNC: 132 MG/DL
NON HDL CHOLESTEROL: 174 MG/DL (ref 0–149)
PHOSPHATE SERPL-MCNC: 3.2 MG/DL (ref 2.5–4.9)
POTASSIUM SERPL-SCNC: 4.3 MMOL/L (ref 3.5–5.3)
PSA SERPL-MCNC: <0.1 NG/ML
SODIUM SERPL-SCNC: 140 MMOL/L (ref 136–145)
TRIGL SERPL-MCNC: 211 MG/DL (ref 0–149)
VLDL: 42 MG/DL (ref 0–40)

## 2024-02-12 PROCEDURE — 80061 LIPID PANEL: CPT

## 2024-02-12 PROCEDURE — 80069 RENAL FUNCTION PANEL: CPT

## 2024-02-12 PROCEDURE — 83036 HEMOGLOBIN GLYCOSYLATED A1C: CPT

## 2024-02-12 PROCEDURE — 84153 ASSAY OF PSA TOTAL: CPT

## 2024-02-12 PROCEDURE — 36415 COLL VENOUS BLD VENIPUNCTURE: CPT

## 2024-02-13 ENCOUNTER — OFFICE VISIT (OUTPATIENT)
Dept: ORTHOPEDIC SURGERY | Facility: HOSPITAL | Age: 64
End: 2024-02-13
Payer: COMMERCIAL

## 2024-02-13 DIAGNOSIS — M25.362 KNEE INSTABILITY, LEFT: ICD-10-CM

## 2024-02-13 DIAGNOSIS — M17.12 PRIMARY OSTEOARTHRITIS OF LEFT KNEE: Primary | ICD-10-CM

## 2024-02-13 PROCEDURE — L1812 KO ELASTIC W/JOINTS PRE OTS: HCPCS | Performed by: FAMILY MEDICINE

## 2024-02-13 PROCEDURE — 99213 OFFICE O/P EST LOW 20 MIN: CPT | Performed by: FAMILY MEDICINE

## 2024-02-13 PROCEDURE — 1036F TOBACCO NON-USER: CPT | Performed by: FAMILY MEDICINE

## 2024-02-13 RX ORDER — MELOXICAM 15 MG/1
15 TABLET ORAL DAILY
Qty: 30 TABLET | Refills: 1 | Status: SHIPPED | OUTPATIENT
Start: 2024-02-13 | End: 2024-04-03

## 2024-02-13 ASSESSMENT — PAIN - FUNCTIONAL ASSESSMENT: PAIN_FUNCTIONAL_ASSESSMENT: 0-10

## 2024-02-13 NOTE — PROGRESS NOTES
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 Darnell.    Today, 2/13/2024, he presents for evaluation of left knee pain and swelling.  He is very active adult individual with knee pain.  He skis, play soccer, rides a bicycle, but has stopped running due to his knee pain.  He recently was seen by Dr. Darnell on 3/26/2023 for his knee.  They discussed the need for eventual surgery.  He had an MRI which revealed a large lateral meniscus tear.  Unfortunately, he was diagnosed with prostate cancer and recently underwent surgical treatment and this delayed his knee treatment.  Recently, at the end of January he was on an alpine skiing trip and developed swelling behind the knee.  He treated it with compression and ice and the symptoms improved.  He called into his knee physician and was referred to me for further nonsurgical treatments including possible Baker's cyst aspiration.  Today he reports his symptoms are significantly improved but wanted to keep his appointment.  He denies any further problems with the left knee.  Is no problems with the right knee.    He has no other complaints.    Review of Systems  Constitutional: no fever, no chills, not feeling tired, no recent weight gain and no recent weight loss.   ENT: no nosebleeds.   Cardiovascular: no chest pain.   Respiratory: no shortness of breath and no cough.   Gastrointestinal: no abdominal pain, no nausea, no diarrhea and no vomiting.   Musculoskeletal: as noted in HPI and no arthralgias.   Integumentary: no rashes and no skin wound.   Neurological: no headache.   Psychiatric: no sleep disturbances and no depression.   Endocrine: no muscle weakness and no muscle cramps.   Hematologic/Lymphatic: no swollen glands and no tendency for easy bruising.    Physical  Examination:     The LEFT knee is without obvious signs of acute bony deformity, swelling, erythema, or ecchymosis.  There is trace joint effusion.  There is a small palpable Baker's cyst but nonpainful.  The patella is without tenderness. Apprehension is negative with medial and lateral glide. Patella crepitus is positive.  The medial joint line is nontender and without bony crepitus or step-off. The lateral joint line is mildly tender and without bony crepitus or step-off. Flexion & extension are full and symmetrical.  Valgus stress test produces laxity but no pain at 0 degrees of flexion.  Varus stress test at 0° and 30° of flexion, Lachman's, Anahi's, and posterior drawer are all negative. The opposite knee is nontender and stable. Gait is pain-free and tandem.    Constitutional: General appearance = Alert and in no acute distress. Well developed, well nourished.   Head and face: Normal.    Eyes: External Eye, Conjunctiva and Lids=Normal external exam; pupils were equal in size, round, reactive to light (PERRL) and extraocular movements intact (EOMI).   Ears, Nose, Mouth, and Throat: External inspection of ears and nose=Normal.   Hearing= Normal.    Neck: No neck mass was observed. Supple.   Pulmonary: Respiratory effort = No respiratory distress.   Cardiovascular: Examination of extremities= No peripheral edema.   Skin and subcutaneous tissue: Normal skin color and pigmentation, normal skin turgor, and no rash; palpation of skin and subcutaneous tissue=Normal.    Neurologic: Sensation= Normal.    Psychiatric: Judgment and insight= Intact.  Orientation to person, place, and time: Alert and oriented x 3.  Mood and affect= Normal.    Imaging:  Radiographs of the left knee recently obtained were reviewed and revealed minimal arthrosis worse along the lateral joint line.  These were compared to outside MRI findings which revealed a large lateral meniscus tear.  The studies were reviewed by me personally in the  office today.    === 03/06/23 ===  KNEE  - Impression -  Minimal to mild degenerative change of the knees without osseous  injury evident.      Problem List Items Addressed This Visit             ICD-10-CM    Primary osteoarthritis of left knee - Primary M17.12    Relevant Medications    meloxicam (Mobic) 15 mg tablet    Other Relevant Orders    KO Lateral OA     Knee instability, left M25.362    Relevant Orders    KO Lateral OA        Assessment and Plan:     We reviewed the exam and x-ray findings and discussed the conservative and surgical treatment options. We agreed to continue to treat his left knee degenerative joint disease with a degenerative meniscus tear conservatively at this time.  We agreed that he did not need any joint or Baker's cyst aspiration today nor does he need a cortisone injection.  He was very interested in viscosupplementation and was given information to call my  at his leisure to pursue insurance approval.  We agreed upon an off-the-shelf orthopedic brace for his active lifestyle.  He was started on prescription meloxicam and will stop his OTC ibuprofen.  He will follow-up in the next 1 to 2 months to start gel injections.    **Patient was prescribed a reaction OA lateral  for knee instability and DJD.The patient is ambulatory with or without aid; but, has weakness, instability and/or deformity of their left lower extremity which requires stabilization from this orthosis to improve their function.      Verbal and written instructions for the use, wear schedule, cleaning and application of this item were given.  Patient was instructed that should the brace result in increased pain, decreased sensation, increased swelling, or an overall worsening of their medical condition, to please contact our office immediately.     Orthotic management and training was provided for skin care, modifications due to healing tissues, edema changes, interruption in skin  integrity, and safety precautions with the orthosis.      **This note was dictated using Dragon speech recognition software and was not corrected for spelling or grammatical errors**.    Star Chandra MD  Sports Medicine Specialist  [facility] [place]

## 2024-02-13 NOTE — LETTER
February 15, 2024     Fabio Marquez MD  [address] [place] [facility]  [facility] [address]    Patient: Buddy Izquierdo   YOB: 1960   Date of Visit: 2/13/2024       Dear Dr. Fabio Marquez MD:    Thank you for referring Buddy Izquierdo to me for evaluation. Below are my notes for this consultation.  If you have questions, please do not hesitate to call me. I look forward to following your patient along with you.       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 Darnell.    Today, 2/13/2024, he presents for evaluation of left knee pain and swelling.  He is very active adult individual with knee pain.  He skis, play soccer, rides a bicycle, but has stopped running due to his knee pain.  He recently was seen by Dr. Darnell on 3/26/2023 for his knee.  They discussed the need for eventual surgery.  He had an MRI which revealed a large lateral meniscus tear.  Unfortunately, he was diagnosed with prostate cancer and recently underwent surgical treatment and this delayed his knee treatment.  Recently, at the end of January he was on an alpine skiing trip and developed swelling behind the knee.  He treated it with compression and ice and the symptoms improved.  He called into his knee physician and was referred to me for further nonsurgical treatments including possible Baker's cyst aspiration.  Today he reports his symptoms are significantly improved but wanted to keep his appointment.  He denies any further problems with the left knee.  Is no problems with the right knee.    He has no other complaints.    Review of  Systems  Constitutional: no fever, no chills, not feeling tired, no recent weight gain and no recent weight loss.   ENT: no nosebleeds.   Cardiovascular: no chest pain.   Respiratory: no shortness of breath and no cough.   Gastrointestinal: no abdominal pain, no nausea, no diarrhea and no vomiting.   Musculoskeletal: as noted in HPI and no arthralgias.   Integumentary: no rashes and no skin wound.   Neurological: no headache.   Psychiatric: no sleep disturbances and no depression.   Endocrine: no muscle weakness and no muscle cramps.   Hematologic/Lymphatic: no swollen glands and no tendency for easy bruising.    Physical Examination:     The LEFT knee is without obvious signs of acute bony deformity, swelling, erythema, or ecchymosis.  There is trace joint effusion.  There is a small palpable Baker's cyst but nonpainful.  The patella is without tenderness. Apprehension is negative with medial and lateral glide. Patella crepitus is positive.  The medial joint line is nontender and without bony crepitus or step-off. The lateral joint line is mildly tender and without bony crepitus or step-off. Flexion & extension are full and symmetrical.  Valgus stress test produces laxity but no pain at 0 degrees of flexion.  Varus stress test at 0° and 30° of flexion, Lachman's, Anahi's, and posterior drawer are all negative. The opposite knee is nontender and stable. Gait is pain-free and tandem.    Constitutional: General appearance = Alert and in no acute distress. Well developed, well nourished.   Head and face: Normal.    Eyes: External Eye, Conjunctiva and Lids=Normal external exam; pupils were equal in size, round, reactive to light (PERRL) and extraocular movements intact (EOMI).   Ears, Nose, Mouth, and Throat: External inspection of ears and nose=Normal.   Hearing= Normal.    Neck: No neck mass was observed. Supple.   Pulmonary: Respiratory effort = No respiratory distress.   Cardiovascular: Examination of  extremities= No peripheral edema.   Skin and subcutaneous tissue: Normal skin color and pigmentation, normal skin turgor, and no rash; palpation of skin and subcutaneous tissue=Normal.    Neurologic: Sensation= Normal.    Psychiatric: Judgment and insight= Intact.  Orientation to person, place, and time: Alert and oriented x 3.  Mood and affect= Normal.    Imaging:  Radiographs of the left knee recently obtained were reviewed and revealed minimal arthrosis worse along the lateral joint line.  These were compared to outside MRI findings which revealed a large lateral meniscus tear.  The studies were reviewed by me personally in the office today.    === 03/06/23 ===  KNEE  - Impression -  Minimal to mild degenerative change of the knees without osseous  injury evident.      Problem List Items Addressed This Visit             ICD-10-CM    Primary osteoarthritis of left knee - Primary M17.12    Relevant Medications    meloxicam (Mobic) 15 mg tablet    Other Relevant Orders    KO Lateral OA     Knee instability, left M25.362    Relevant Orders    KO Lateral OA        Assessment and Plan:     We reviewed the exam and x-ray findings and discussed the conservative and surgical treatment options. We agreed to continue to treat his left knee degenerative joint disease with a degenerative meniscus tear conservatively at this time.  We agreed that he did not need any joint or Baker's cyst aspiration today nor does he need a cortisone injection.  He was very interested in viscosupplementation and was given information to call my  at his leisure to pursue insurance approval.  We agreed upon an off-the-shelf orthopedic brace for his active lifestyle.  He was started on prescription meloxicam and will stop his OTC ibuprofen.  He will follow-up in the next 1 to 2 months to start gel injections.    **Patient was prescribed a reaction OA lateral  for knee instability and DJD.The patient is ambulatory  with or without aid; but, has weakness, instability and/or deformity of their left lower extremity which requires stabilization from this orthosis to improve their function.      Verbal and written instructions for the use, wear schedule, cleaning and application of this item were given.  Patient was instructed that should the brace result in increased pain, decreased sensation, increased swelling, or an overall worsening of their medical condition, to please contact our office immediately.     Orthotic management and training was provided for skin care, modifications due to healing tissues, edema changes, interruption in skin integrity, and safety precautions with the orthosis.      **This note was dictated using Dragon speech recognition software and was not corrected for spelling or grammatical errors**.    Star Chandra MD  Sports Medicine Specialist  [facility] [place]

## 2024-02-15 ENCOUNTER — OFFICE VISIT (OUTPATIENT)
Dept: UROLOGY | Facility: CLINIC | Age: 64
End: 2024-02-15
Payer: COMMERCIAL

## 2024-02-15 DIAGNOSIS — C61 MALIGNANT NEOPLASM OF PROSTATE (MULTI): Primary | ICD-10-CM

## 2024-02-15 DIAGNOSIS — K43.9 HERNIA OF ABDOMINAL WALL: ICD-10-CM

## 2024-02-15 PROCEDURE — 99213 OFFICE O/P EST LOW 20 MIN: CPT | Performed by: STUDENT IN AN ORGANIZED HEALTH CARE EDUCATION/TRAINING PROGRAM

## 2024-02-15 PROCEDURE — 1036F TOBACCO NON-USER: CPT | Performed by: STUDENT IN AN ORGANIZED HEALTH CARE EDUCATION/TRAINING PROGRAM

## 2024-02-15 NOTE — PROGRESS NOTES
HPI:  Proc (5/16/23): TP prostate biopsy   Path: prostatic adenocarcinoma, GG3 (Neil 4+3=7), 2/2 cores (65% of tissue), LEEANNA #1 GG2 ([place] 3+4=7), 4/5 cores (80% of tissue), pattern 4, ASAP, perineural invasion, HGPIN    Proc (9/26/23): robotic assisted laparoscopic prostatectomy with bilateral pelvic lymph node dissection   Path: prostatic adenocarcinoma, GG2 (Neil score 3+4=7), 6-10% pattern 4, lymph nodes neg     63 year old male referred by Dr. Walker for elevated PSA. Hx of prostatitis, HTN, UTIs. MRI Prostate (4/3/23) showed PI-RADS 4 lesion in the right posterolateral PZ prostate apex, without evidence of extraprostatic extension. S/p TP prostate biopsy (5/16/23) with pathology showing prostatic adenocarcinoma, GG3 ([place] 4+3=7), 2/2 cores (65% of tissue), LEEANNA #1 GG2 ([place] 3+4=7), 4/5 cores (80% of tissue), pattern 4, ASAP, perineural invasion, HGPIN. S/p biopsy hematuria. PSMA/PET (6/16/23) showed heterogenous Ga68 PSMA uptake throughout the prostate gland with focally increased Ga68 PSMA expression within the apical prostate gland consistent with biopsy-proven prostate cancer, no evidence for pelvic lymph node metastasis, no evidence for distant metastatic disease including osseous metastases. S/p robotic assisted laparoscopic prostatectomy with bilateral pelvic lymph node dissection (9/26/23) with pathology showing prostatic adenocarcinoma, GG2 (Neil score 3+4=7), 6-10% pattern 4, lymph nodes neg. PSA <0.10 (2/12/24). Reports TAMAR improving. Erections are not sufficient for intercourse yet, but improving on Cialis. Reports burning sensation in lower abdomen during orgasm.       PSA: <0.10 (2/12/24), <0.10 (11/16/23), 5.53 (2/23/23)     MRI Prostate (4/3/23): 18.5g  PI-RADS 4 lesion in the right posterolateral PZ prostate apex, without evidence of extraprostatic extension.     PSMA/PET (6/16/23): heterogenous Ga68 PSMA uptake throughout the prostate gland with focally increased Ga68 PSMA  expression within the apical prostate gland consistent with biopsy-proven prostate cancer, no evidence for pelvic lymph node metastasis, no evidence for distant metastatic disease including osseous metastases.    Review of Systems:  All systems reviewed. Anything negative noted in the HPI.    Physical Exam:  Vitals signs reviewed.  Constitutional:      Appearance: Well-developed.  HENT:     Head: Normocephalic and atraumatic.  Neck:     Musculoskeletal: Normal range of motion.  Pulmonary:     Effort: Pulmonary effort is normal.  Musculoskeletal: Normal range of motion.  Skin:     General: Skin is warm and dry.  Neurological:     Mental Status: Alert and oriented to person, place, and time.  Psychiatric:        Behavior: Behavior normal.        Thought Content: Thought content normal.        Judgment: Judgment normal.  Genitourinary:     Penis: Normal.      Scrotum/Testes: Normal.     Comments:  Abdominal:     Palpations: Abdomen is soft. There is no mass.     Tenderness: There is no tenderness. There is no guarding or rebound.     Hernia: small hernia is present.     Comments: Well healed incisions    Assessment/Plan   63 year old male referred by Dr. Walker for elevated PSA. Hx of prostatitis, HTN, UTIs. MRI Prostate (4/3/23) showed PI-RADS 4 lesion in the right posterolateral PZ prostate apex, without evidence of extraprostatic extension. S/p TP prostate biopsy (5/16/23) with pathology showing prostatic adenocarcinoma, GG3 ([place] 4+3=7), 2/2 cores (65% of tissue), LEEANNA #1 GG2 ([place] 3+4=7), 4/5 cores (80% of tissue), pattern 4, ASAP, perineural invasion, HGPIN. S/p biopsy hematuria. PSMA/PET (6/16/23) showed heterogenous Ga68 PSMA uptake throughout the prostate gland with focally increased Ga68 PSMA expression within the apical prostate gland consistent with biopsy-proven prostate cancer, no evidence for pelvic lymph node metastasis, no evidence for distant metastatic disease including osseous metastases. S/p  robotic assisted laparoscopic prostatectomy with bilateral pelvic lymph node dissection (9/26/23) with pathology showing prostatic adenocarcinoma, GG2 ([place] score 3+4=7), 6-10% pattern 4, lymph nodes neg. PSA <0.10 (2/12/24). Reports TAMAR improving. Erections are not sufficient for intercourse yet, but improving on Cialis. Management options including risks, benefits and alternatives discussed at length and all questions answered. Patient prefers to proceed with follow-up in 3mos with PSA. Will refer to Dr. Pearson for ICI consult.     Will refer to GI for hernia repair.                 By signing my name below, I, Sara Kirkland, attest that this documentation has been prepared under the direction and in the presence of Dr. Vinicio Robles.  All medical record entries made by the Sharriibe were at my direction and personally dictated by me. I have reviewed the chart and agree that the record accurately reflects my personal performance of the history, physical exam, discussion and plan.

## 2024-02-15 NOTE — LETTER
February 15, 2024     Fabio Marquez MD  [address] [facility], [address]  [place] [address]    Patient: Buddy Izquierod   YOB: 1960   Date of Visit: 2/15/2024       Dear Dr. Fabio Marquez MD:    Thank you for referring Buddy Izquierdo to me for evaluation. Below are my notes for this consultation.  If you have questions, please do not hesitate to call me. I look forward to following your patient along with you.       Sincerely,     Vinicio Robles MD      CC: No Recipients  ______________________________________________________________________________________    HPI:  Proc (5/16/23): TP prostate biopsy   Path: prostatic adenocarcinoma, GG3 ([place] 4+3=7), 2/2 cores (65% of tissue), LEEANNA #1 GG2 ([place] 3+4=7), 4/5 cores (80% of tissue), pattern 4, ASAP, perineural invasion, HGPIN    Proc (9/26/23): robotic assisted laparoscopic prostatectomy with bilateral pelvic lymph node dissection   Path: prostatic adenocarcinoma, GG2 ([place] score 3+4=7), 6-10% pattern 4, lymph nodes neg     63 year old male referred by Dr. Walker for elevated PSA. Hx of prostatitis, HTN, UTIs. MRI Prostate (4/3/23) showed PI-RADS 4 lesion in the right posterolateral PZ prostate apex, without evidence of extraprostatic extension. S/p TP prostate biopsy (5/16/23) with pathology showing prostatic adenocarcinoma, GG3 ([place] 4+3=7), 2/2 cores (65% of tissue), LEEANNA #1 GG2 ([place] 3+4=7), 4/5 cores (80% of tissue), pattern 4, ASAP, perineural invasion, HGPIN. S/p biopsy hematuria. PSMA/PET (6/16/23) showed heterogenous Ga68 PSMA uptake throughout the prostate gland with focally increased Ga68 PSMA expression within the apical prostate gland consistent with biopsy-proven prostate cancer, no evidence for pelvic lymph node metastasis, no evidence for distant metastatic disease including osseous metastases. S/p robotic assisted laparoscopic prostatectomy with bilateral pelvic lymph node  dissection (9/26/23) with pathology showing prostatic adenocarcinoma, GG2 ([place] score 3+4=7), 6-10% pattern 4, lymph nodes neg. PSA <0.10 (2/12/24). Reports TAMAR improving. Erections are not sufficient for intercourse yet, but improving on Cialis. Reports burning sensation in lower abdomen during orgasm.       PSA: <0.10 (2/12/24), <0.10 (11/16/23), 5.53 (2/23/23)     MRI Prostate (4/3/23): 18.5g  PI-RADS 4 lesion in the right posterolateral PZ prostate apex, without evidence of extraprostatic extension.     PSMA/PET (6/16/23): heterogenous Ga68 PSMA uptake throughout the prostate gland with focally increased Ga68 PSMA expression within the apical prostate gland consistent with biopsy-proven prostate cancer, no evidence for pelvic lymph node metastasis, no evidence for distant metastatic disease including osseous metastases.    Review of Systems:  All systems reviewed. Anything negative noted in the HPI.    Physical Exam:  Vitals signs reviewed.  Constitutional:      Appearance: Well-developed.  HENT:     Head: Normocephalic and atraumatic.  Neck:     Musculoskeletal: Normal range of motion.  Pulmonary:     Effort: Pulmonary effort is normal.  Musculoskeletal: Normal range of motion.  Skin:     General: Skin is warm and dry.  Neurological:     Mental Status: Alert and oriented to person, place, and time.  Psychiatric:        Behavior: Behavior normal.        Thought Content: Thought content normal.        Judgment: Judgment normal.  Genitourinary:     Penis: Normal.      Scrotum/Testes: Normal.     Comments:  Abdominal:     Palpations: Abdomen is soft. There is no mass.     Tenderness: There is no tenderness. There is no guarding or rebound.     Hernia: small hernia is present.     Comments: Well healed incisions    Assessment/Plan  63 year old male referred by Dr. Walker for elevated PSA. Hx of prostatitis, HTN, UTIs. MRI Prostate (4/3/23) showed PI-RADS 4 lesion in the right posterolateral PZ prostate apex,  without evidence of extraprostatic extension. S/p TP prostate biopsy (5/16/23) with pathology showing prostatic adenocarcinoma, GG3 ([place] 4+3=7), 2/2 cores (65% of tissue), LEEANNA #1 GG2 ([place] 3+4=7), 4/5 cores (80% of tissue), pattern 4, ASAP, perineural invasion, HGPIN. S/p biopsy hematuria. PSMA/PET (6/16/23) showed heterogenous Ga68 PSMA uptake throughout the prostate gland with focally increased Ga68 PSMA expression within the apical prostate gland consistent with biopsy-proven prostate cancer, no evidence for pelvic lymph node metastasis, no evidence for distant metastatic disease including osseous metastases. S/p robotic assisted laparoscopic prostatectomy with bilateral pelvic lymph node dissection (9/26/23) with pathology showing prostatic adenocarcinoma, GG2 (Neil score 3+4=7), 6-10% pattern 4, lymph nodes neg. PSA <0.10 (2/12/24). Reports TAMAR improving. Erections are not sufficient for intercourse yet, but improving on Cialis. Management options including risks, benefits and alternatives discussed at length and all questions answered. Patient prefers to proceed with follow-up in 3mos with PSA. Will refer to Dr. Pearson for ICI consult.     Will refer to GI for hernia repair.                 By signing my name below, I, Sara Kirkland, attest that this documentation has been prepared under the direction and in the presence of Dr. Vinicio Robles.  All medical record entries made by the Sara were at my direction and personally dictated by me. I have reviewed the chart and agree that the record accurately reflects my personal performance of the history, physical exam, discussion and plan.

## 2024-02-28 DIAGNOSIS — I10 ESSENTIAL HYPERTENSION, BENIGN: ICD-10-CM

## 2024-02-28 RX ORDER — LOSARTAN POTASSIUM 50 MG/1
50 TABLET ORAL DAILY
Qty: 90 TABLET | Refills: 1 | Status: SHIPPED | OUTPATIENT
Start: 2024-02-28 | End: 2025-02-27

## 2024-03-07 ENCOUNTER — OFFICE VISIT (OUTPATIENT)
Dept: SURGERY | Facility: CLINIC | Age: 64
End: 2024-03-07
Payer: COMMERCIAL

## 2024-03-07 VITALS
WEIGHT: 204.6 LBS | HEART RATE: 67 BPM | HEIGHT: 71 IN | BODY MASS INDEX: 28.64 KG/M2 | SYSTOLIC BLOOD PRESSURE: 124 MMHG | DIASTOLIC BLOOD PRESSURE: 78 MMHG | TEMPERATURE: 98 F

## 2024-03-07 DIAGNOSIS — K43.9 HERNIA OF ABDOMINAL WALL: ICD-10-CM

## 2024-03-07 DIAGNOSIS — K40.90 RIGHT INGUINAL HERNIA: Primary | ICD-10-CM

## 2024-03-07 PROCEDURE — 3078F DIAST BP <80 MM HG: CPT | Performed by: SURGERY

## 2024-03-07 PROCEDURE — 99213 OFFICE O/P EST LOW 20 MIN: CPT | Performed by: SURGERY

## 2024-03-07 PROCEDURE — 1036F TOBACCO NON-USER: CPT | Performed by: SURGERY

## 2024-03-07 PROCEDURE — 3074F SYST BP LT 130 MM HG: CPT | Performed by: SURGERY

## 2024-03-07 ASSESSMENT — PAIN SCALES - GENERAL: PAINLEVEL: 0-NO PAIN

## 2024-03-07 NOTE — PROGRESS NOTES
"History Of Present Illness  Buddy Izquierdo is a 63 y.o. male presenting right inguinal hernia.  Patient is known about the slight hernia for several years.  Recently is has more pain for it.  He does a lot of outdoor activities.  Both skiing and biking.  He did have prostate surgery last year.  He had a robotic prostatectomy.  He had previous orthopedic surgeries also.  He works in the outdoor industry.        Last Recorded Vitals  Blood pressure 124/78, pulse 67, temperature 36.7 °C (98 °F), height 1.803 m (5' 11\"), weight 92.8 kg (204 lb 9.6 oz).  Physical Examination  Awake and alert.  Normal respirations.  He is abdominal incisions from his prostatectomy.  He has a right inguinal hernia.  No evidence of a left inguinal hernia.      Relevant Results laboratory results were normal      Assessment/Plan right inguinal hernia.  I reviewed the hernia booklets with him.  We could do this laparoscopically.  He is going debatable whether he wants a sooner at a later date.  He will contact us when he wants to proceed with a laparoscopic right inguinal hernia repair.    Cyril Willis MD FACS  Professor of Surgery  Jessica Caballero Chair in Surgical [place]  [facility] [employer]  [address] [place] [address], [address]  Phone [phone]  email: [email or web address]        "

## 2024-03-12 ENCOUNTER — APPOINTMENT (OUTPATIENT)
Dept: UROLOGY | Facility: CLINIC | Age: 64
End: 2024-03-12
Payer: COMMERCIAL

## 2024-03-13 ENCOUNTER — OFFICE VISIT (OUTPATIENT)
Dept: ORTHOPEDIC SURGERY | Facility: HOSPITAL | Age: 64
End: 2024-03-13
Payer: COMMERCIAL

## 2024-03-13 DIAGNOSIS — M17.12 PRIMARY OSTEOARTHRITIS OF LEFT KNEE: ICD-10-CM

## 2024-03-13 PROCEDURE — 20611 DRAIN/INJ JOINT/BURSA W/US: CPT | Mod: LT | Performed by: FAMILY MEDICINE

## 2024-03-13 PROCEDURE — 99214 OFFICE O/P EST MOD 30 MIN: CPT | Mod: 25 | Performed by: FAMILY MEDICINE

## 2024-03-13 PROCEDURE — 2500000004 HC RX 250 GENERAL PHARMACY W/ HCPCS (ALT 636 FOR OP/ED): Mod: JZ | Performed by: FAMILY MEDICINE

## 2024-03-13 RX ADMIN — Medication 48 MG: at 15:17

## 2024-03-13 ASSESSMENT — PAIN SCALES - GENERAL: PAINLEVEL_OUTOF10: 0 - NO PAIN

## 2024-03-13 ASSESSMENT — PAIN - FUNCTIONAL ASSESSMENT: PAIN_FUNCTIONAL_ASSESSMENT: 0-10

## 2024-03-13 NOTE — PROGRESS NOTES
Patient ID: Buddy Izquierdo is a 63 y.o. male.    L Inj/Asp: L knee on 3/13/2024 3:17 PM  Indications: pain  Details: 21 G needle, ultrasound-guided superolateral approach  Medications: 48 mg hylan 48 mg/6 mL  Outcome: tolerated well, no immediate complications  Procedure, treatment alternatives, risks and benefits explained, specific risks discussed. Consent was given by the patient. Immediately prior to procedure a time out was called to verify the correct patient, procedure, equipment, support staff and site/side marked as required. Patient was prepped and draped in the usual sterile fashion.       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 Darnell.    On 2/13/2024, he presents for evaluation of left knee pain and swelling.  He is very active adult individual with knee pain.  He skis, play soccer, rides a bicycle, but has stopped running due to his knee pain.  He recently was seen by Dr. Darnell on 3/26/2023 for his knee.  They discussed the need for eventual surgery.  He had an MRI which revealed a large lateral meniscus tear.  Unfortunately, he was diagnosed with prostate cancer and recently underwent surgical treatment and this delayed his knee treatment.  Recently, at the end of January he was on an alpine skiing trip and developed swelling behind the knee.  He treated it with compression and ice and the symptoms improved.  He called into his knee physician and was referred to me for further nonsurgical treatments including possible Baker's cyst aspiration.  Today he reports his symptoms are significantly improved but wanted to keep his appointment.  He denies any further problems with the left knee.  Is no problems with the right  knee.  We agreed to continue to treat his left knee degenerative joint disease with a degenerative meniscus tear conservatively at this time.  We agreed that he did not need any joint or Baker's cyst aspiration today nor does he need a cortisone injection.  He was very interested in viscosupplementation and was given information to call my  at his leisure to pursue insurance approval.  We agreed upon an off-the-shelf orthopedic brace for his active lifestyle.  He was started on prescription meloxicam and will stop his OTC ibuprofen.  He will follow-up in the next 1 to 2 months to start gel injections.    Today, 3/13/2024, he returns for follow-up of left knee DJD and to start a trial of viscosupplementation with Synvisc 1.  He is requesting long-term analgesia.  He reports the left knee  brace works great.  He denies interval injury or trauma.    He has no other complaints.    Physical Examination:     The LEFT knee is without obvious signs of acute bony deformity, swelling, erythema, or ecchymosis.  There is trace joint effusion.  There is a small palpable Baker's cyst but nonpainful.  The patella is without tenderness. Apprehension is negative with medial and lateral glide. Patella crepitus is positive.  The medial joint line is nontender and without bony crepitus or step-off. The lateral joint line is mildly tender and without bony crepitus or step-off. Flexion & extension are full and symmetrical.  Valgus stress test produces laxity but no pain at 0 degrees of flexion.  Varus stress test at 0° and 30° of flexion, Lachman's, Anahi's, and posterior drawer are all negative. The opposite knee is nontender and stable. Gait is pain-free and tandem.    Imaging:  outside MRI findings which revealed a large lateral meniscus tear.  === 03/06/23 ===  KNEE  - Impression -  Minimal to mild degenerative change of the knees without osseous  injury evident.    Procedure:  After consent was obtained, the left  knee was prepped in a sterile fashion. Ultrasound guidance was used to help insure proper needle placement into the knee joint, decrease patient discomfort, and decrease collateral damage. The joint was visualized and Synvisc 1 was injected without any complications. Ultrasound images were saved on an internal file for later reference. The patient tolerated the procedure well and the area was cleaned and bandaged.    Problem List Items Addressed This Visit             ICD-10-CM    Primary osteoarthritis of left knee M17.12    Relevant Orders    Point of Care Ultrasound (Completed)    L Inj/Asp: L knee       Assessment and Plan:     We reviewed the exam and x-ray findings and discussed the conservative and surgical treatment options. We agreed upon a trial of Synvisc 1 for his left knee DJD.  He tolerated this well.  Activity modifications were reviewed.  He will continue with his knee brace and his other conservative treatment recommendations.  He will follow-up in 8 weeks or sooner for recheck.    **This note was dictated using Dragon speech recognition software and was not corrected for spelling or grammatical errors**.    Star Chandra MD  Sports Medicine Specialist  [facility] [place]

## 2024-03-15 PROBLEM — K40.90 RIGHT INGUINAL HERNIA: Status: ACTIVE | Noted: 2024-03-07

## 2024-03-27 ENCOUNTER — APPOINTMENT (OUTPATIENT)
Dept: UROLOGY | Facility: CLINIC | Age: 64
End: 2024-03-27
Payer: COMMERCIAL

## 2024-04-03 ENCOUNTER — OFFICE VISIT (OUTPATIENT)
Dept: UROLOGY | Facility: CLINIC | Age: 64
End: 2024-04-03
Payer: COMMERCIAL

## 2024-04-03 VITALS — HEART RATE: 96 BPM

## 2024-04-03 DIAGNOSIS — M17.12 PRIMARY OSTEOARTHRITIS OF LEFT KNEE: ICD-10-CM

## 2024-04-03 DIAGNOSIS — C61 MALIGNANT NEOPLASM OF PROSTATE (MULTI): ICD-10-CM

## 2024-04-03 DIAGNOSIS — C61 PROSTATE CANCER (MULTI): Primary | ICD-10-CM

## 2024-04-03 PROCEDURE — 99214 OFFICE O/P EST MOD 30 MIN: CPT | Performed by: UROLOGY

## 2024-04-03 PROCEDURE — 1036F TOBACCO NON-USER: CPT | Performed by: UROLOGY

## 2024-04-03 PROCEDURE — G2211 COMPLEX E/M VISIT ADD ON: HCPCS | Performed by: UROLOGY

## 2024-04-03 RX ORDER — MELOXICAM 15 MG/1
15 TABLET ORAL DAILY
Qty: 30 TABLET | Refills: 1 | Status: SHIPPED | OUTPATIENT
Start: 2024-04-03

## 2024-04-03 NOTE — PROGRESS NOTES
HPI:  63 y.o. yo male patient complains of  erectile dysfunction.  Erections before surgery were 9-10/10    Duration: since September 2023  Rigidity of erections:  2-3/10 (rarely)  Morning Erections: No  s/p prostatectomy: yes in September 2023 with Shoag  On nitrates/NTG?: No  Smoker: No  Partner:   Tried PDE5is?: yes  Viagra/sildenafil - response: 1 daily (no difference to erections when taking pills)  Cialis/tadalafil- response: tried previously, had hematuria and needed to discontinue. After that resolved he continued Cialis.  Tried penile injections: no  Libido/Desire: Good  Have been on Testosterone /anabolic steroids? No  Pain or curvature in penis when erect: None  Premature or delayed ejaculation:  None    *Reports hernia surgery in 2 weeks and has some pain*    Labs:  Lab Results   Component Value Date    PSA <0.10 02/12/2024     Lab Results   Component Value Date    HGBA1C 5.5 02/12/2024         PMH:  Past Medical History:   Diagnosis Date    Bee allergy status 09/03/2019    History of bee sting allergy    Dorsalgia, unspecified 07/22/2015    Back pain, acute    Encounter for general adult medical examination without abnormal findings 12/18/2017    Blood tests for routine general physical examination    Encounter for immunization     Encounter for immunization    Encounter for other orthopedic aftercare 10/26/2020    Orthopedic aftercare    Encounter for screening for malignant neoplasm of colon 09/09/2021    Colon cancer screening    Encounter for screening for malignant neoplasm of prostate 12/17/2017    Prostate cancer screening    Encounter for screening for other viral diseases 12/17/2017    Need for hepatitis C screening test    Iliotibial band syndrome, left leg 12/28/2020    Iliotibial band tendinitis of left side    Other tear of lateral meniscus, current injury, left knee, initial encounter 12/14/2020    Tear of lateral meniscus of left knee, unspecified tear type, unspecified whether old  or current tear, initial encounter    Pain in left hip 08/10/2018    Left hip pain    Pain in left knee 10/06/2020    Left knee pain    Pain in left knee 09/27/2021    Left knee pain    Pain in left shoulder 08/28/2014    Left shoulder pain    Pain in unspecified ankle and joints of unspecified foot 12/18/2017    Ankle pain    Pain in unspecified elbow 12/18/2017    Elbow pain    Personal history of other diseases of male genital organs 12/18/2017    History of epididymitis    Personal history of other diseases of the respiratory system 11/25/2020    History of upper respiratory infection    Strain of muscle, fascia and tendon of the posterior muscle group at thigh level, left thigh, initial encounter 08/22/2018    Left hamstring muscle strain, initial encounter    Unspecified abdominal pain 07/22/2015    Abdominal cramping    Unspecified dislocation of unspecified acromioclavicular joint, initial encounter 08/29/2014    AC separation        PSH:  No past surgical history on file.     Medications:    Current Outpatient Medications:     EPINEPHrine 0.3 mg/0.3 mL injection syringe, Inject 0.3 mL (0.3 mg) into the muscle 1 time if needed. As Directed, Disp: , Rfl:     losartan (Cozaar) 50 mg tablet, Take 1 tablet (50 mg) by mouth once daily., Disp: 90 tablet, Rfl: 1    meloxicam (Mobic) 15 mg tablet, Take 1 tablet (15 mg) by mouth once daily., Disp: 30 tablet, Rfl: 1    sildenafil (Viagra) 25 mg tablet, Take 1 tablet (25 mg) by mouth once daily., Disp: 90 tablet, Rfl: 3    tadalafil (Cialis) 5 mg tablet, Take 1 tablet (5 mg) by mouth once daily., Disp: 10 tablet, Rfl: 0    tadalafil (Cialis) 5 mg tablet, Take 5mg daily and one day a week take 20mg instead of daily 5mg dose (Patient not taking: Reported on 11/16/2023), Disp: 90 tablet, Rfl: 3    Allergy:  No Known Allergies     Exam  Testicles descended bilaterally, nontender, no masses  Vasa palpable bilaterally  Penis circ'd, no lesions, no  plaques      Assessment/Plan  #Erectile Dysfunction sp RALP, refractory to pills  - Will schedule intracavernosal injection and penile duplex ultrasound. Discussed that this involves an injection in the penis and subsequent ultrasound of penis to measure vasculature. This may result in a prolonged erection, which may require injection of reversal agent prior to discharge. Risks of priapism, pain, scar tissue, bruising, discussed. All questions answered.  -Fu with penile doppler next week    #prostate ca sp ralp  -psa undetectable    G [id number]  Visit complexity inherent to evaluation and management (E&M) associated with medical care services that serve as the continuing focal point for all needed health care services and/or with medical care services that are part of ongoing care related to a patient's single, serious condition or a complex condition.      Scribe Attestation  By signing my name below, I, Sara Edge   attest that this documentation has been prepared under the direction and in the presence of William Pearson MD.

## 2024-04-10 ENCOUNTER — APPOINTMENT (OUTPATIENT)
Dept: UROLOGY | Facility: CLINIC | Age: 64
End: 2024-04-10
Payer: COMMERCIAL

## 2024-04-15 ENCOUNTER — ANESTHESIA EVENT (OUTPATIENT)
Dept: OPERATING ROOM | Facility: HOSPITAL | Age: 64
End: 2024-04-15
Payer: COMMERCIAL

## 2024-04-15 ENCOUNTER — ANESTHESIA (OUTPATIENT)
Dept: OPERATING ROOM | Facility: HOSPITAL | Age: 64
End: 2024-04-15
Payer: COMMERCIAL

## 2024-04-15 ENCOUNTER — HOSPITAL ENCOUNTER (OUTPATIENT)
Facility: HOSPITAL | Age: 64
Setting detail: OUTPATIENT SURGERY
Discharge: HOME | End: 2024-04-15
Attending: SURGERY | Admitting: SURGERY
Payer: COMMERCIAL

## 2024-04-15 VITALS
HEART RATE: 64 BPM | OXYGEN SATURATION: 95 % | SYSTOLIC BLOOD PRESSURE: 157 MMHG | RESPIRATION RATE: 16 BRPM | TEMPERATURE: 97.2 F | DIASTOLIC BLOOD PRESSURE: 88 MMHG

## 2024-04-15 DIAGNOSIS — G89.18 POST-OPERATIVE PAIN: ICD-10-CM

## 2024-04-15 DIAGNOSIS — K40.90 RIGHT INGUINAL HERNIA: Primary | ICD-10-CM

## 2024-04-15 PROBLEM — E78.5 HYPERLIPIDEMIA: Status: ACTIVE | Noted: 2024-04-15

## 2024-04-15 PROCEDURE — 2500000004 HC RX 250 GENERAL PHARMACY W/ HCPCS (ALT 636 FOR OP/ED): Performed by: NURSE ANESTHETIST, CERTIFIED REGISTERED

## 2024-04-15 PROCEDURE — 2500000005 HC RX 250 GENERAL PHARMACY W/O HCPCS: Performed by: NURSE ANESTHETIST, CERTIFIED REGISTERED

## 2024-04-15 PROCEDURE — 7100000009 HC PHASE TWO TIME - INITIAL BASE CHARGE: Performed by: SURGERY

## 2024-04-15 PROCEDURE — 3600000003 HC OR TIME - INITIAL BASE CHARGE - PROCEDURE LEVEL THREE: Performed by: SURGERY

## 2024-04-15 PROCEDURE — 2500000004 HC RX 250 GENERAL PHARMACY W/ HCPCS (ALT 636 FOR OP/ED): Performed by: ANESTHESIOLOGY

## 2024-04-15 PROCEDURE — 3600000008 HC OR TIME - EACH INCREMENTAL 1 MINUTE - PROCEDURE LEVEL THREE: Performed by: SURGERY

## 2024-04-15 PROCEDURE — C1781 MESH (IMPLANTABLE): HCPCS | Performed by: SURGERY

## 2024-04-15 PROCEDURE — 7100000002 HC RECOVERY ROOM TIME - EACH INCREMENTAL 1 MINUTE: Performed by: SURGERY

## 2024-04-15 PROCEDURE — 49650 LAP ING HERNIA REPAIR INIT: CPT | Performed by: SURGERY

## 2024-04-15 PROCEDURE — 7100000010 HC PHASE TWO TIME - EACH INCREMENTAL 1 MINUTE: Performed by: SURGERY

## 2024-04-15 PROCEDURE — 2500000004 HC RX 250 GENERAL PHARMACY W/ HCPCS (ALT 636 FOR OP/ED): Performed by: SURGERY

## 2024-04-15 PROCEDURE — A49650 PR LAP,INGUINAL HERNIA REPR,INITIAL: Performed by: NURSE ANESTHETIST, CERTIFIED REGISTERED

## 2024-04-15 PROCEDURE — 2780000003 HC OR 278 NO HCPCS: Performed by: SURGERY

## 2024-04-15 PROCEDURE — 2500000005 HC RX 250 GENERAL PHARMACY W/O HCPCS: Performed by: SURGERY

## 2024-04-15 PROCEDURE — 3700000001 HC GENERAL ANESTHESIA TIME - INITIAL BASE CHARGE: Performed by: SURGERY

## 2024-04-15 PROCEDURE — A49650 PR LAP,INGUINAL HERNIA REPR,INITIAL: Performed by: ANESTHESIOLOGY

## 2024-04-15 PROCEDURE — 2720000007 HC OR 272 NO HCPCS: Performed by: SURGERY

## 2024-04-15 PROCEDURE — A4217 STERILE WATER/SALINE, 500 ML: HCPCS | Performed by: SURGERY

## 2024-04-15 PROCEDURE — 7100000001 HC RECOVERY ROOM TIME - INITIAL BASE CHARGE: Performed by: SURGERY

## 2024-04-15 PROCEDURE — 2500000001 HC RX 250 WO HCPCS SELF ADMINISTERED DRUGS (ALT 637 FOR MEDICARE OP): Performed by: NURSE ANESTHETIST, CERTIFIED REGISTERED

## 2024-04-15 PROCEDURE — 3700000002 HC GENERAL ANESTHESIA TIME - EACH INCREMENTAL 1 MINUTE: Performed by: SURGERY

## 2024-04-15 DEVICE — PATCH, MESH, MARLEX, 6 X 6 IN, POLYPROPYLENE: Type: IMPLANTABLE DEVICE | Site: ABDOMEN | Status: FUNCTIONAL

## 2024-04-15 RX ORDER — ROCURONIUM BROMIDE 10 MG/ML
INJECTION, SOLUTION INTRAVENOUS AS NEEDED
Status: DISCONTINUED | OUTPATIENT
Start: 2024-04-15 | End: 2024-04-15

## 2024-04-15 RX ORDER — CEFAZOLIN 1 G/1
INJECTION, POWDER, FOR SOLUTION INTRAVENOUS AS NEEDED
Status: DISCONTINUED | OUTPATIENT
Start: 2024-04-15 | End: 2024-04-15

## 2024-04-15 RX ORDER — MIDAZOLAM HYDROCHLORIDE 1 MG/ML
INJECTION, SOLUTION INTRAMUSCULAR; INTRAVENOUS AS NEEDED
Status: DISCONTINUED | OUTPATIENT
Start: 2024-04-15 | End: 2024-04-15

## 2024-04-15 RX ORDER — SODIUM CHLORIDE, SODIUM LACTATE, POTASSIUM CHLORIDE, CALCIUM CHLORIDE 600; 310; 30; 20 MG/100ML; MG/100ML; MG/100ML; MG/100ML
INJECTION, SOLUTION INTRAVENOUS CONTINUOUS PRN
Status: DISCONTINUED | OUTPATIENT
Start: 2024-04-15 | End: 2024-04-15

## 2024-04-15 RX ORDER — HYDROMORPHONE HYDROCHLORIDE 1 MG/ML
0.5 INJECTION, SOLUTION INTRAMUSCULAR; INTRAVENOUS; SUBCUTANEOUS EVERY 5 MIN PRN
Status: DISCONTINUED | OUTPATIENT
Start: 2024-04-15 | End: 2024-04-15 | Stop reason: HOSPADM

## 2024-04-15 RX ORDER — SODIUM CHLORIDE 0.9 G/100ML
IRRIGANT IRRIGATION AS NEEDED
Status: DISCONTINUED | OUTPATIENT
Start: 2024-04-15 | End: 2024-04-15 | Stop reason: HOSPADM

## 2024-04-15 RX ORDER — BUPIVACAINE HCL/EPINEPHRINE 0.5-1:200K
VIAL (ML) INJECTION AS NEEDED
Status: DISCONTINUED | OUTPATIENT
Start: 2024-04-15 | End: 2024-04-15 | Stop reason: HOSPADM

## 2024-04-15 RX ORDER — ONDANSETRON HYDROCHLORIDE 2 MG/ML
INJECTION, SOLUTION INTRAVENOUS AS NEEDED
Status: DISCONTINUED | OUTPATIENT
Start: 2024-04-15 | End: 2024-04-15

## 2024-04-15 RX ORDER — LIDOCAINE HYDROCHLORIDE 10 MG/ML
0.1 INJECTION, SOLUTION EPIDURAL; INFILTRATION; INTRACAUDAL; PERINEURAL ONCE
Status: DISCONTINUED | OUTPATIENT
Start: 2024-04-15 | End: 2024-04-15 | Stop reason: HOSPADM

## 2024-04-15 RX ORDER — LIDOCAINE HYDROCHLORIDE 40 MG/ML
SOLUTION TOPICAL AS NEEDED
Status: DISCONTINUED | OUTPATIENT
Start: 2024-04-15 | End: 2024-04-15

## 2024-04-15 RX ORDER — METOCLOPRAMIDE HYDROCHLORIDE 5 MG/ML
10 INJECTION INTRAMUSCULAR; INTRAVENOUS ONCE AS NEEDED
Status: DISCONTINUED | OUTPATIENT
Start: 2024-04-15 | End: 2024-04-15 | Stop reason: HOSPADM

## 2024-04-15 RX ORDER — SODIUM CHLORIDE, SODIUM LACTATE, POTASSIUM CHLORIDE, CALCIUM CHLORIDE 600; 310; 30; 20 MG/100ML; MG/100ML; MG/100ML; MG/100ML
100 INJECTION, SOLUTION INTRAVENOUS CONTINUOUS
Status: DISCONTINUED | OUTPATIENT
Start: 2024-04-15 | End: 2024-04-15 | Stop reason: HOSPADM

## 2024-04-15 RX ORDER — LABETALOL HYDROCHLORIDE 5 MG/ML
5 INJECTION, SOLUTION INTRAVENOUS ONCE AS NEEDED
Status: DISCONTINUED | OUTPATIENT
Start: 2024-04-15 | End: 2024-04-15 | Stop reason: HOSPADM

## 2024-04-15 RX ORDER — OXYCODONE AND ACETAMINOPHEN 5; 325 MG/1; MG/1
1 TABLET ORAL EVERY 4 HOURS PRN
Status: DISCONTINUED | OUTPATIENT
Start: 2024-04-15 | End: 2024-04-15 | Stop reason: HOSPADM

## 2024-04-15 RX ORDER — PROPOFOL 10 MG/ML
INJECTION, EMULSION INTRAVENOUS AS NEEDED
Status: DISCONTINUED | OUTPATIENT
Start: 2024-04-15 | End: 2024-04-15

## 2024-04-15 RX ORDER — TRAMADOL HYDROCHLORIDE 50 MG/1
50 TABLET ORAL EVERY 6 HOURS PRN
Qty: 8 TABLET | Refills: 0 | Status: SHIPPED | OUTPATIENT
Start: 2024-04-15 | End: 2024-04-22

## 2024-04-15 RX ORDER — ACETAMINOPHEN 325 MG/1
650 TABLET ORAL EVERY 4 HOURS PRN
Status: DISCONTINUED | OUTPATIENT
Start: 2024-04-15 | End: 2024-04-15 | Stop reason: HOSPADM

## 2024-04-15 RX ORDER — HYDROMORPHONE HYDROCHLORIDE 1 MG/ML
0.2 INJECTION, SOLUTION INTRAMUSCULAR; INTRAVENOUS; SUBCUTANEOUS EVERY 5 MIN PRN
Status: DISCONTINUED | OUTPATIENT
Start: 2024-04-15 | End: 2024-04-15 | Stop reason: HOSPADM

## 2024-04-15 RX ORDER — SUCCINYLCHOLINE CHLORIDE 20 MG/ML
INJECTION INTRAMUSCULAR; INTRAVENOUS AS NEEDED
Status: DISCONTINUED | OUTPATIENT
Start: 2024-04-15 | End: 2024-04-15

## 2024-04-15 RX ORDER — DROPERIDOL 2.5 MG/ML
0.62 INJECTION, SOLUTION INTRAMUSCULAR; INTRAVENOUS ONCE AS NEEDED
Status: DISCONTINUED | OUTPATIENT
Start: 2024-04-15 | End: 2024-04-15 | Stop reason: HOSPADM

## 2024-04-15 RX ORDER — OXYCODONE HYDROCHLORIDE 5 MG/1
10 TABLET ORAL EVERY 4 HOURS PRN
Status: DISCONTINUED | OUTPATIENT
Start: 2024-04-15 | End: 2024-04-15 | Stop reason: HOSPADM

## 2024-04-15 RX ORDER — FENTANYL CITRATE 50 UG/ML
INJECTION, SOLUTION INTRAMUSCULAR; INTRAVENOUS AS NEEDED
Status: DISCONTINUED | OUTPATIENT
Start: 2024-04-15 | End: 2024-04-15

## 2024-04-15 RX ADMIN — MIDAZOLAM 2 MG: 1 INJECTION INTRAMUSCULAR; INTRAVENOUS at 07:18

## 2024-04-15 RX ADMIN — ROCURONIUM 20 MG: 50 INJECTION, SOLUTION INTRAVENOUS at 07:20

## 2024-04-15 RX ADMIN — ROCURONIUM 10 MG: 50 INJECTION, SOLUTION INTRAVENOUS at 07:39

## 2024-04-15 RX ADMIN — LIDOCAINE HYDROCHLORIDE 4 ML: 40 SOLUTION TOPICAL at 07:20

## 2024-04-15 RX ADMIN — SODIUM CHLORIDE, POTASSIUM CHLORIDE, SODIUM LACTATE AND CALCIUM CHLORIDE: 600; 310; 30; 20 INJECTION, SOLUTION INTRAVENOUS at 07:18

## 2024-04-15 RX ADMIN — SUGAMMADEX 200 MG: 100 INJECTION, SOLUTION INTRAVENOUS at 08:04

## 2024-04-15 RX ADMIN — DEXAMETHASONE SODIUM PHOSPHATE 4 MG: 4 INJECTION INTRA-ARTICULAR; INTRALESIONAL; INTRAMUSCULAR; INTRAVENOUS; SOFT TISSUE at 07:24

## 2024-04-15 RX ADMIN — SUCCINYLCHOLINE CHLORIDE 100 MG: 20 INJECTION, SOLUTION INTRAMUSCULAR; INTRAVENOUS at 07:20

## 2024-04-15 RX ADMIN — ONDANSETRON 4 MG: 2 INJECTION, SOLUTION INTRAMUSCULAR; INTRAVENOUS at 08:02

## 2024-04-15 RX ADMIN — PROPOFOL 120 MG: 10 INJECTION, EMULSION INTRAVENOUS at 07:20

## 2024-04-15 RX ADMIN — FENTANYL CITRATE 100 MCG: 50 INJECTION, SOLUTION INTRAMUSCULAR; INTRAVENOUS at 07:20

## 2024-04-15 RX ADMIN — PROPOFOL 20 MG: 10 INJECTION, EMULSION INTRAVENOUS at 08:02

## 2024-04-15 RX ADMIN — CEFAZOLIN 2 G: 1 INJECTION, POWDER, FOR SOLUTION INTRAMUSCULAR; INTRAVENOUS at 07:24

## 2024-04-15 RX ADMIN — HYDROMORPHONE HYDROCHLORIDE 0.5 MG: 1 INJECTION, SOLUTION INTRAMUSCULAR; INTRAVENOUS; SUBCUTANEOUS at 08:30

## 2024-04-15 RX ADMIN — HYDROMORPHONE HYDROCHLORIDE 0.5 MG: 1 INJECTION, SOLUTION INTRAMUSCULAR; INTRAVENOUS; SUBCUTANEOUS at 08:24

## 2024-04-15 SDOH — HEALTH STABILITY: MENTAL HEALTH: CURRENT SMOKER: 0

## 2024-04-15 ASSESSMENT — PAIN - FUNCTIONAL ASSESSMENT
PAIN_FUNCTIONAL_ASSESSMENT: 0-10

## 2024-04-15 ASSESSMENT — PAIN SCALES - GENERAL
PAINLEVEL_OUTOF10: 7
PAIN_LEVEL: 1
PAINLEVEL_OUTOF10: 7
PAINLEVEL_OUTOF10: 7
PAINLEVEL_OUTOF10: 3

## 2024-04-15 ASSESSMENT — COLUMBIA-SUICIDE SEVERITY RATING SCALE - C-SSRS
6. HAVE YOU EVER DONE ANYTHING, STARTED TO DO ANYTHING, OR PREPARED TO DO ANYTHING TO END YOUR LIFE?: NO
1. IN THE PAST MONTH, HAVE YOU WISHED YOU WERE DEAD OR WISHED YOU COULD GO TO SLEEP AND NOT WAKE UP?: NO
2. HAVE YOU ACTUALLY HAD ANY THOUGHTS OF KILLING YOURSELF?: NO

## 2024-04-15 NOTE — ANESTHESIA PROCEDURE NOTES
Airway  Date/Time: 4/15/2024 7:22 AM  Urgency: elective    Airway not difficult    Staffing  Performed: CRNA   Authorized by: Noel Armstrong MD    Performed by: DEENA Jameson-KELLIE  Patient location during procedure: OR    Indications and Patient Condition  Indications for airway management: anesthesia and airway protection  Spontaneous ventilation: present  Sedation level: deep  Preoxygenated: yes  Patient position: sniffing  MILS maintained throughout  Mask difficulty assessment: 1 - vent by mask  Planned trial extubation    Final Airway Details  Final airway type: endotracheal airway      Successful airway: ETT  Cuffed: yes   Successful intubation technique: direct laryngoscopy  Facilitating devices/methods: intubating stylet  Endotracheal tube insertion site: oral  Blade: Gerry  Blade size: #4  ETT size (mm): 7.5  Cormack-Lehane Classification: grade III - view of epiglottis only  Placement verified by: chest auscultation   Cuff volume (mL): 7  Measured from: lips  ETT to lips (cm): 25

## 2024-04-15 NOTE — ANESTHESIA POSTPROCEDURE EVALUATION
Patient: Buddy Izquierdo    Procedure Summary       Date: 04/15/24 Room / Location: [facility] OR 01 / [facility] [place] [facility] [place]    Anesthesia Start: 0718 Anesthesia Stop: 0812    Procedure: Repair Inguinal Hernia Laparoscopy (Right) Diagnosis:       Right inguinal hernia      (Right inguinal hernia [K40.90])    Surgeons: Cyril Willis MD Responsible Provider: Noel Armstrong MD    Anesthesia Type: general ASA Status: 2            Anesthesia Type: general    Vitals Value Taken Time   /65 04/15/24 0816   Temp 35.9 °C (96.6 °F) 04/15/24 0810   Pulse 59 04/15/24 0821   Resp 24 04/15/24 0821   SpO2 97 % 04/15/24 0821   Vitals shown include unfiled device data.    Anesthesia Post Evaluation    Patient location during evaluation: PACU  Patient participation: complete - patient participated  Level of consciousness: awake  Pain score: 1  Pain management: adequate  Airway patency: fixed obstruction  Cardiovascular status: acceptable  Respiratory status: acceptable  Hydration status: acceptable  Postoperative Nausea and Vomiting: none        No notable events documented.

## 2024-04-15 NOTE — H&P
History Of Present Illness  As per chart review:  Buddy Izquierdo is a 63 y.o. male presenting for right inguinal hernia.  Patient has known about the slight hernia for several years but it is now causing increasing pain. Pt participates in many outdoor activities including skiing and biking.  Pt had robotic prostatectomy last year with previous orthopedic surgeries.     No new changes since last seen in clinic 3/7/24. Denies N/V, SOB, CP, abd pain, fever, chills.     Past Medical History  Past Medical History:   Diagnosis Date    Bee allergy status 09/03/2019    History of bee sting allergy    Dorsalgia, unspecified 07/22/2015    Back pain, acute    Encounter for general adult medical examination without abnormal findings 12/18/2017    Blood tests for routine general physical examination    Encounter for immunization     Encounter for immunization    Encounter for other orthopedic aftercare 10/26/2020    Orthopedic aftercare    Encounter for screening for malignant neoplasm of colon 09/09/2021    Colon cancer screening    Encounter for screening for malignant neoplasm of prostate 12/17/2017    Prostate cancer screening    Encounter for screening for other viral diseases 12/17/2017    Need for hepatitis C screening test    Iliotibial band syndrome, left leg 12/28/2020    Iliotibial band tendinitis of left side    Other tear of lateral meniscus, current injury, left knee, initial encounter 12/14/2020    Tear of lateral meniscus of left knee, unspecified tear type, unspecified whether old or current tear, initial encounter    Pain in left hip 08/10/2018    Left hip pain    Pain in left knee 10/06/2020    Left knee pain    Pain in left knee 09/27/2021    Left knee pain    Pain in left shoulder 08/28/2014    Left shoulder pain    Pain in unspecified ankle and joints of unspecified foot 12/18/2017    Ankle pain    Pain in unspecified elbow 12/18/2017    Elbow pain    Personal history of other diseases of male genital  organs 12/18/2017    History of epididymitis    Personal history of other diseases of the respiratory system 11/25/2020    History of upper respiratory infection    Strain of muscle, fascia and tendon of the posterior muscle group at thigh level, left thigh, initial encounter 08/22/2018    Left hamstring muscle strain, initial encounter    Unspecified abdominal pain 07/22/2015    Abdominal cramping    Unspecified dislocation of unspecified acromioclavicular joint, initial encounter 08/29/2014    AC separation       Surgical History  No past surgical history on file.     Social History  He reports that he has never smoked. He has never used smokeless tobacco. No history on file for alcohol use and drug use.    Family History  Family History   Problem Relation Name Age of Onset    Cancer Other      Diabetes Other      Hypertension Other      Heart disease Other          Allergies  Patient has no known allergies.    12-point ROS performed and negative unless otherwise stated in HPI     Physical Exam  Gen - Alert/oriented  Neuro - AO3  CV - RR  Pulm - NL on RA  Abd&groin - incisions well healed from prostatectomy, R inguinal hernia, no evidence of left  Skin - warm and dry  Psych - appropriate mood    Last Recorded Vitals  There were no vitals taken for this visit.    Relevant Results           Assessment/Plan   Principal Problem:    Right inguinal hernia      Pt with R inguinal hernia causing pain. Will proceed with Lap R inguinal hernia repair today with Dr. Willis.       Rudy Hurd MD  PGY1, [place] [facility]

## 2024-04-15 NOTE — OP NOTE
Repair Inguinal Hernia Laparoscopy (R) Operative Note     Date: 4/15/2024  OR Location: [facility] OR    Name: Buddy Izquierdo, : 1960, Age: 63 y.o., MRN: [id number], Sex: male    Diagnosis  Pre-op Diagnosis     * Right inguinal hernia [K40.90] Post-op Diagnosis     * Right inguinal hernia [K40.90]     Procedures  Repair Inguinal Hernia Laparoscopy  [id number] - [place] LAPAROSCOPY SURG RPR INITIAL INGUINAL HERNIA      Surgeons      * Cyril Willis - Primary    Resident/Fellow/Other Assistant:  Surgeons and Role:     * Melecio Jones MD - Resident - Assisting    Procedure Summary  Anesthesia: General  ASA: II  Anesthesia Staff: Anesthesiologist: Noel Armstrong MD  CRNA: DEENA Jameson-CRNA  Estimated Blood Loss: 5mL  Intra-op Medications:   Administrations occurring from 0715 to 0815 on 04/15/24:   Medication Name Total Dose   sodium chloride 0.9 % irrigation solution 1,000 mL   BUPivacaine-EPINEPHrine (Marcaine w/EPI) 0.5 %-1:200,000 injection 15 mL              Anesthesia Record               Intraprocedure I/O Totals          Intake    LR infusion 400.00 mL    Total Intake 400 mL       Output    Est. Blood Loss 5 mL    Total Output 5 mL       Net    Net Volume 395 mL          Specimen: No specimens collected     Staff:   Circulator: Elaine Abebe RN  Scrub Person: Renu Toro RN; Magdalena Sloan RN         Drains and/or Catheters: * None in log *    Tourniquet Times:         Implants:  Implants       Type Name Action Serial No.      Surgical Mesh Sling Implant PATCH, MESH, MARLEX, 6 X 6 IN, POLYPROPYLENE - [id number] Implanted [id number]              Findings: indirect inguinal hernia    Indications: Buddy Izquierdo is an 63 y.o. male who is having surgery for Right inguinal hernia [K40.90].     The patient was seen in the preoperative area. The risks, benefits, complications, treatment options, non-operative alternatives, expected recovery and outcomes were discussed with the patient. The  possibilities of reaction to medication, pulmonary aspiration, injury to surrounding structures, bleeding, recurrent infection, the need for additional procedures, failure to diagnose a condition, and creating a complication requiring transfusion or operation were discussed with the patient. The patient concurred with the proposed plan, giving informed consent.  The site of surgery was properly noted/marked if necessary per policy. The patient has been actively warmed in preoperative area. Preoperative antibiotics have been ordered and given within 1 hours of incision. Venous thrombosis prophylaxis have been ordered including bilateral sequential compression devices    Procedure Details: Patient brought to operating room general anesthesia performed.  Patient abdomen is prepped and draped.  Using the previous supraumbilical vertical incision from his prostatectomy I used a Crabtree technique and entered and insufflated his abdomen.  Then placed 2 femoral trocars out laterally on each side.  He had an indirect right inguinal hernia.  The peritoneum was opened.  Reduce the hernia in's entirety.  We have local immediately to the direct space and opened up the space.  I was able to take the cord lipoma down also.  Then used a 3 x 5 and half piece of mesh to cover the entire floor the inguinal canal.  Is fixated with absorbable tacker.  Then left up the peritoneum and fixated the peritoneum over the mesh I completely cover the mesh.  I remove my trocars.  Closed the fascial defect umbilicus 0 Vicryl.  Skin with 4-0 Vicryl.  Complications:  None; patient tolerated the procedure well.    Disposition: PACU - hemodynamically stable.  Condition: stable     Attending Attestation: I was present and scrubbed for the entire procedure.    Cyril Willis  Phone Number: [phone]

## 2024-04-15 NOTE — ANESTHESIA PREPROCEDURE EVALUATION
Patient: Buddy Izquierdo    Procedure Information       Anesthesia Start Date/Time: 04/15/24 0718    Procedure: Repair Inguinal Hernia Laparoscopy (Right) - no hernia balloons. transabdominal    Location: [facility] OR  / [facility] OR    Surgeons: Cyril Willis MD            Relevant Problems   Anesthesia (within normal limits)      Cardiac   (+) Essential hypertension, benign   (+) Hyperlipidemia      Pulmonary (within normal limits)      Neuro (within normal limits)      GI (within normal limits)      /Renal   (+) Malignant neoplasm of prostate (Multi)      Liver (within normal limits)      Endocrine (within normal limits)      Hematology (within normal limits)      Musculoskeletal   (+) Primary osteoarthritis of left knee       Clinical information reviewed:   Tobacco  Allergies  Meds   Med Hx  Surg Hx   Fam Hx  Soc Hx        NPO Detail:  NPO/Void Status  Date of Last Liquid: 04/15/24  Time of Last Liquid: 0515  Date of Last Solid: 04/14/24  Time of Last Solid: 1900  Last Intake Type: Clear fluids         Physical Exam    Airway  Mallampati: II  TM distance: >3 FB  Neck ROM: full     Cardiovascular   Rhythm: regular  Rate: normal     Dental - normal exam     Pulmonary   Breath sounds clear to auscultation     Abdominal          Anesthesia Plan    History of general anesthesia?: yes  History of complications of general anesthesia?: no    ASA 2     general     The patient is not a current smoker.    intravenous induction   Anesthetic plan and risks discussed with patient.  Use of blood products discussed with patient who.    Plan discussed with CRNA and attending.

## 2024-04-16 ASSESSMENT — PAIN SCALES - GENERAL: PAINLEVEL_OUTOF10: 2

## 2024-04-23 ENCOUNTER — APPOINTMENT (OUTPATIENT)
Dept: UROLOGY | Facility: CLINIC | Age: 64
End: 2024-04-23
Payer: COMMERCIAL

## 2024-04-25 ENCOUNTER — OFFICE VISIT (OUTPATIENT)
Dept: SURGERY | Facility: CLINIC | Age: 64
End: 2024-04-25
Payer: COMMERCIAL

## 2024-04-25 VITALS
DIASTOLIC BLOOD PRESSURE: 90 MMHG | SYSTOLIC BLOOD PRESSURE: 132 MMHG | WEIGHT: 204.8 LBS | BODY MASS INDEX: 28.56 KG/M2 | HEART RATE: 77 BPM | TEMPERATURE: 97.8 F

## 2024-04-25 DIAGNOSIS — K40.90 RIGHT INGUINAL HERNIA: Primary | ICD-10-CM

## 2024-04-25 PROCEDURE — 99024 POSTOP FOLLOW-UP VISIT: CPT | Performed by: SURGERY

## 2024-04-25 PROCEDURE — 3075F SYST BP GE 130 - 139MM HG: CPT | Performed by: SURGERY

## 2024-04-25 PROCEDURE — 3080F DIAST BP >= 90 MM HG: CPT | Performed by: SURGERY

## 2024-04-25 PROCEDURE — 1036F TOBACCO NON-USER: CPT | Performed by: SURGERY

## 2024-04-25 NOTE — PROGRESS NOTES
History Of Present Illness  Buddy Izquierdo is a 63 y.o. male presenting status post laparoscopic right inguinal hernia repair.  We did this transabdominally because of his previous prostate surgery and just last year.  He is doing well still little pain.  The surgery is only a week ago.      Last Recorded Vitals  Blood pressure 132/90, pulse 77, temperature 36.6 °C (97.8 °F), weight 92.9 kg (204 lb 12.8 oz).  Physical Exam wounds healing well.  Removed Steri-Strips.      Assessment/Plan   He is doing well status post his laparoscopic right inguinal hernia repair.  He can start resuming his activities.  He will follow-up me as needed.      Cyril Willis MD FACS  Professor of Surgery  Jessica Caballero Chair in Surgical [place]  [employer]  [address] [place] [address], [address]  Phone [phone]  email: [email or web address]

## 2024-05-08 ENCOUNTER — OFFICE VISIT (OUTPATIENT)
Dept: ORTHOPEDIC SURGERY | Facility: HOSPITAL | Age: 64
End: 2024-05-08
Payer: COMMERCIAL

## 2024-05-08 DIAGNOSIS — M17.12 PRIMARY OSTEOARTHRITIS OF LEFT KNEE: Primary | ICD-10-CM

## 2024-05-08 PROCEDURE — 99213 OFFICE O/P EST LOW 20 MIN: CPT | Performed by: FAMILY MEDICINE

## 2024-05-09 NOTE — PROGRESS NOTES
Sports Medicine Office Note    Today's Date:  05/08/2024     HPI: Buddy Izquierdo is a 63 y.o. action sports equipment  who presents today status post a trial of Synvisc 1.    On 2/13/2024, he presents for evaluation of left knee pain and swelling upon referral by Dr. Darnell. He is very active adult individual with knee pain.  He skis, play soccer, rides a bicycle, but has stopped running due to his knee pain.  He recently was seen by Dr. Darnell on 3/26/2023 for his knee.  They discussed the need for eventual surgery.  He had an MRI which revealed a large lateral meniscus tear.  Unfortunately, he was diagnosed with prostate cancer and recently underwent surgical treatment and this delayed his knee treatment.  Recently, at the end of January he was on an alpine skiing trip and developed swelling behind the knee.  He treated it with compression and ice and the symptoms improved.  He called into his knee physician and was referred to me for further nonsurgical treatments including possible Baker's cyst aspiration.  Today he reports his symptoms are significantly improved but wanted to keep his appointment.  He denies any further problems with the left knee.  Is no problems with the right knee.  We agreed to continue to treat his left knee degenerative joint disease with a degenerative meniscus tear conservatively at this time.  We agreed that he did not need any joint or Baker's cyst aspiration today nor does he need a cortisone injection.  He was very interested in viscosupplementation and was given information to call my  at his leisure to pursue insurance approval.  We agreed upon an off-the-shelf orthopedic brace for his active lifestyle.  He was started on prescription meloxicam and will stop his OTC ibuprofen.  He will follow-up in the next 1 to 2 months to start gel injections.    On 3/13/2024, he returns for follow-up of left knee DJD and to start a trial of  viscosupplementation with Synvisc 1.  He is requesting long-term analgesia.  He reports the left knee  brace works great.  He denies interval injury or trauma.  We agreed upon a trial of Synvisc 1 for his left knee DJD.  He tolerated this well.  Activity modifications were reviewed.  He will continue with his knee brace and his other conservative treatment recommendations.  He will follow-up in 8 weeks or sooner for recheck.    Today, 5/8/2024, he returns for an 8-week follow-up status post a trial of Synvisc 1 viscosupplementation for left knee DJD.  Overall he feels 80% better.  He is able to ski and walk 3 to 5 miles without any pain.  He is wearing his brace daily.  He is cycling without pain.  He is very happy with his improvement.  He denies interval injury or trauma.    He has no other complaints.    Physical Examination:     The LEFT knee is without obvious signs of acute bony deformity, swelling, erythema, or ecchymosis.  There is no effusion.  The patella is without tenderness. Apprehension is negative with medial and lateral glide. Patella crepitus is positive.  The medial joint line is nontender and without bony crepitus or step-off. The lateral joint line is no longer tender and without bony crepitus or step-off. Flexion & extension are full and symmetrical.  There is no instability with stress testing.  The opposite knee is nontender and stable. Gait is pain-free and tandem.    Imaging:  outside MRI findings which revealed a large lateral meniscus tear.  === 03/06/23 ===  KNEE  - Impression -  Minimal to mild degenerative change of the knees without osseous  injury evident.      Problem List Items Addressed This Visit             ICD-10-CM    Primary osteoarthritis of left knee - Primary M17.12       Assessment and Plan:     We reviewed the exam and x-ray findings and discussed the conservative and surgical treatment options. We agreed that he responded very well to the trial of Synvisc 1  viscosupplementation for left knee DJD.  He understands this can be repeated every 6 months or later as needed.  He will continue his current conservative treatment plan of wearing his brace.  He will follow-up when his pain returns.    **This note was dictated using Dragon speech recognition software and was not corrected for spelling or grammatical errors**.    Star Chandra MD  Sports Medicine Specialist  [facility] [place]

## 2024-05-13 ENCOUNTER — LAB (OUTPATIENT)
Dept: LAB | Facility: LAB | Age: 64
End: 2024-05-13
Payer: COMMERCIAL

## 2024-05-13 DIAGNOSIS — C61 MALIGNANT NEOPLASM OF PROSTATE (MULTI): ICD-10-CM

## 2024-05-13 LAB — PSA SERPL-MCNC: <0.1 NG/ML

## 2024-05-13 PROCEDURE — 84153 ASSAY OF PSA TOTAL: CPT

## 2024-05-13 PROCEDURE — 36415 COLL VENOUS BLD VENIPUNCTURE: CPT

## 2024-05-14 ENCOUNTER — PROCEDURE VISIT (OUTPATIENT)
Dept: UROLOGY | Facility: CLINIC | Age: 64
End: 2024-05-14
Payer: COMMERCIAL

## 2024-05-14 VITALS — TEMPERATURE: 97 F

## 2024-05-14 DIAGNOSIS — Z09 ENCOUNTER FOR FOLLOW-UP: ICD-10-CM

## 2024-05-14 DIAGNOSIS — N52.31 ERECTILE DYSFUNCTION AFTER RADICAL PROSTATECTOMY: Primary | ICD-10-CM

## 2024-05-14 DIAGNOSIS — C61 PROSTATE CANCER (MULTI): ICD-10-CM

## 2024-05-14 PROCEDURE — 54235 NJX CORPORA CAVERNOSA RX AGT: CPT | Performed by: UROLOGY

## 2024-05-14 PROCEDURE — 93980 PENILE VASCULAR STUDY: CPT | Performed by: UROLOGY

## 2024-05-14 PROCEDURE — 99214 OFFICE O/P EST MOD 30 MIN: CPT | Performed by: UROLOGY

## 2024-05-14 RX ORDER — CIPROFLOXACIN 500 MG/1
TABLET ORAL
COMMUNITY
Start: 2023-03-13

## 2024-05-14 RX ORDER — DOXYCYCLINE 100 MG/1
100 TABLET ORAL EVERY 12 HOURS
COMMUNITY

## 2024-05-14 RX ORDER — OXYCODONE HYDROCHLORIDE 5 MG/1
TABLET ORAL
COMMUNITY
Start: 2023-09-26

## 2024-05-14 RX ORDER — SENNOSIDES 8.6 MG/1
TABLET ORAL
COMMUNITY
Start: 2023-09-26

## 2024-05-14 RX ORDER — NICOTINE POLACRILEX 2 MG
LOZENGE BUCCAL
COMMUNITY
Start: 2023-09-26

## 2024-05-14 RX ORDER — TADALAFIL 5 MG/1
TABLET ORAL
Qty: 90 TABLET | Refills: 3 | Status: SHIPPED | OUTPATIENT
Start: 2024-05-14

## 2024-05-14 RX ORDER — TADALAFIL 5 MG/1
TABLET ORAL
Qty: 90 TABLET | Refills: 3 | OUTPATIENT
Start: 2024-05-14

## 2024-05-14 ASSESSMENT — PAIN SCALES - GENERAL: PAINLEVEL: 0-NO PAIN

## 2024-05-14 NOTE — PROGRESS NOTES
HPI:  63 y.o. yo male patient complains of  erectile dysfunction.    Last Visit 4/3/24:  -pdu counseling  -follow up with doppler next week  -PSA undetectable     Todays Visit:  #Erectile Dysfunction  -had doppler today / ** of ** (see separate note)     -Erections before surgery were 9-10/10  Duration: since September 2023  Rigidity of erections:  2-3/10 (rarely)  Morning Erections: No  s/p prostatectomy: yes in September 2023 with Shoag  On nitrates/NTG?: No  Smoker: No  Partner:   Tried PDE5is?: yes  Viagra/sildenafil - response: 1 daily (no difference to erections when taking pills)  Cialis/tadalafil- response: tried previously, had hematuria and needed to discontinue. After that resolved he continued Cialis.  Tried penile injections: no  Libido/Desire: Good  Have been on Testosterone /anabolic steroids? No  Pain or curvature in penis when erect: None  Premature or delayed ejaculation:  None    *Reports hernia surgery in 2 weeks and has some pain*    Labs:  Lab Results   Component Value Date    PSA <0.10 05/13/2024       PMH:  Past Medical History:   Diagnosis Date    Bee allergy status 09/03/2019    History of bee sting allergy    Dorsalgia, unspecified 07/22/2015    Back pain, acute    Encounter for general adult medical examination without abnormal findings 12/18/2017    Blood tests for routine general physical examination    Encounter for immunization     Encounter for immunization    Encounter for other orthopedic aftercare 10/26/2020    Orthopedic aftercare    Encounter for screening for malignant neoplasm of colon 09/09/2021    Colon cancer screening    Encounter for screening for malignant neoplasm of prostate 12/17/2017    Prostate cancer screening    Encounter for screening for other viral diseases 12/17/2017    Need for hepatitis C screening test    Iliotibial band syndrome, left leg 12/28/2020    Iliotibial band tendinitis of left side    Other tear of lateral meniscus, current injury, left  knee, initial encounter 12/14/2020    Tear of lateral meniscus of left knee, unspecified tear type, unspecified whether old or current tear, initial encounter    Pain in left hip 08/10/2018    Left hip pain    Pain in left knee 10/06/2020    Left knee pain    Pain in left knee 09/27/2021    Left knee pain    Pain in left shoulder 08/28/2014    Left shoulder pain    Pain in unspecified ankle and joints of unspecified foot 12/18/2017    Ankle pain    Pain in unspecified elbow 12/18/2017    Elbow pain    Personal history of other diseases of male genital organs 12/18/2017    History of epididymitis    Personal history of other diseases of the respiratory system 11/25/2020    History of upper respiratory infection    Strain of muscle, fascia and tendon of the posterior muscle group at thigh level, left thigh, initial encounter 08/22/2018    Left hamstring muscle strain, initial encounter    Unspecified abdominal pain 07/22/2015    Abdominal cramping    Unspecified dislocation of unspecified acromioclavicular joint, initial encounter 08/29/2014    AC separation        PSH:  No past surgical history on file.     Medications:    Current Outpatient Medications:     EPINEPHrine 0.3 mg/0.3 mL injection syringe, Inject 0.3 mL (0.3 mg) into the muscle 1 time if needed. As Directed, Disp: , Rfl:     losartan (Cozaar) 50 mg tablet, Take 1 tablet (50 mg) by mouth once daily., Disp: 90 tablet, Rfl: 1    meloxicam (Mobic) 15 mg tablet, TAKE 1 TABLET BY MOUTH EVERY DAY, Disp: 30 tablet, Rfl: 1    sildenafil (Viagra) 25 mg tablet, Take 1 tablet (25 mg) by mouth once daily., Disp: 90 tablet, Rfl: 3    tadalafil (Cialis) 5 mg tablet, Take 1 tablet (5 mg) by mouth once daily., Disp: 10 tablet, Rfl: 0    tadalafil (Cialis) 5 mg tablet, Take 5mg daily and one day a week take 20mg instead of daily 5mg dose, Disp: 90 tablet, Rfl: 3    Allergy:  No Known Allergies     Exam  Testicles descended bilaterally, nontender, no masses  Vasa palpable  bilaterally  Penis circ'd, no lesions, no plaques      Assessment/Plan  #Erectile Dysfunction sp RALP, refractory to pills  - We had a long discussion regarding penile prosthesis, including risks, benefits, and alternatives. We discussed risks including but not limited to pain, bleeding, infection, damage to adjacent structures, need for further procedures, malfunction, erosion, extrusion, complications of anesthesia etc. We discussed the postoperative course and expectations, and specifically that after getting an implant, spontaneous erections do not occur other methods such as injections etc are no longer effective. We also discussed the effect of the implant on length and that it will likely be shorter than previous given age and duration of ED. Further, if he gets an infection or the implant has to be removed for any reason, there is potential for him to never have erections again. All questions were answered and reading materials were given.        Start daily Cialis 5 mg daily.   Will revisit IPP discussed 1 year post ralp      #prostate ca sp ralp  -psa undetectable    FU in September.     Scribe Attestation  By signing my name below, ILoan Scribe, attest that this documentation  has been prepared under the direction and in the presence of William Pearson MD.      Scribe Attestation  By signing my name below, IRaissa Scribe attest that this documentation has been prepared under the direction and in the presence of William Pearson MD. All medical record entries made by the Scribe were at my direction or personally dictated by me. I have reviewed the chart and agree that the record accurately reflects my personal performance of the history, physical exam, discussion and plan.

## 2024-05-16 ENCOUNTER — OFFICE VISIT (OUTPATIENT)
Dept: UROLOGY | Facility: CLINIC | Age: 64
End: 2024-05-16
Payer: COMMERCIAL

## 2024-05-16 VITALS — BODY MASS INDEX: 28.56 KG/M2 | TEMPERATURE: 97.3 F | WEIGHT: 204 LBS | HEIGHT: 71 IN

## 2024-05-16 DIAGNOSIS — C61 MALIGNANT NEOPLASM OF PROSTATE (MULTI): Primary | ICD-10-CM

## 2024-05-16 PROCEDURE — 99214 OFFICE O/P EST MOD 30 MIN: CPT | Performed by: STUDENT IN AN ORGANIZED HEALTH CARE EDUCATION/TRAINING PROGRAM

## 2024-05-16 PROCEDURE — G2211 COMPLEX E/M VISIT ADD ON: HCPCS | Performed by: STUDENT IN AN ORGANIZED HEALTH CARE EDUCATION/TRAINING PROGRAM

## 2024-05-16 ASSESSMENT — PAIN SCALES - GENERAL: PAINLEVEL: 0-NO PAIN

## 2024-05-16 NOTE — PROGRESS NOTES
HPI:  Proc (5/16/23): TP prostate biopsy   Path: prostatic adenocarcinoma, GG3 (Neil 4+3=7), 2/2 cores (65% of tissue), LEEANNA #1 GG2 ([place] 3+4=7), 4/5 cores (80% of tissue), pattern 4, ASAP, perineural invasion, HGPIN    Proc (9/26/23): robotic assisted laparoscopic prostatectomy with bilateral pelvic lymph node dissection   Path: prostatic adenocarcinoma, GG2 (Neil score 3+4=7), 6-10% pattern 4, lymph nodes neg     63 year old male referred by Dr. Walker for elevated PSA. Hx of prostatitis, HTN, UTIs. MRI Prostate (4/3/23) showed PI-RADS 4 lesion in the right posterolateral PZ prostate apex, without evidence of extraprostatic extension. S/p TP prostate biopsy (5/16/23) with pathology showing prostatic adenocarcinoma, GG3 ([place] 4+3=7), 2/2 cores (65% of tissue), LEEANNA #1 GG2 ([place] 3+4=7), 4/5 cores (80% of tissue), pattern 4, ASAP, perineural invasion, HGPIN. S/p biopsy hematuria. PSMA/PET (6/16/23) showed heterogenous Ga68 PSMA uptake throughout the prostate gland with focally increased Ga68 PSMA expression within the apical prostate gland consistent with biopsy-proven prostate cancer, no evidence for pelvic lymph node metastasis, no evidence for distant metastatic disease including osseous metastases. S/p robotic assisted laparoscopic prostatectomy with bilateral pelvic lymph node dissection (9/26/23) with pathology showing prostatic adenocarcinoma, GG2 (Neil score 3+4=7), 6-10% pattern 4, lymph nodes neg. PSA <0.10 (5/13/24). Patient has seen Dr. Pearson for ICI (5/14/24) and experienced bruising and pain after injections. ED able to obtain erections with Cialis, but experiences facial blotchy erythema. Doing well.      PSA: <0.10 (5/13/24), <0.10 (2/12/24), <0.10 (11/16/23), 5.53 (2/23/23)     MRI Prostate (4/3/23): 18.5g  PI-RADS 4 lesion in the right posterolateral PZ prostate apex, without evidence of extraprostatic extension.     PSMA/PET (6/16/23): heterogenous Ga68 PSMA uptake  throughout the prostate gland with focally increased Ga68 PSMA expression within the apical prostate gland consistent with biopsy-proven prostate cancer, no evidence for pelvic lymph node metastasis, no evidence for distant metastatic disease including osseous metastases.    Review of Systems:  All systems reviewed. Anything negative noted in the HPI.    Physical Exam:  Vitals signs reviewed.  Constitutional:      Appearance: Well-developed.  HENT:     Head: Normocephalic and atraumatic.  Neck:     Musculoskeletal: Normal range of motion.  Pulmonary:     Effort: Pulmonary effort is normal.  Musculoskeletal: Normal range of motion.  Skin:     General: Skin is warm and dry.  Neurological:     Mental Status: Alert and oriented to person, place, and time.  Psychiatric:        Behavior: Behavior normal.        Thought Content: Thought content normal.        Judgment: Judgment normal.  Genitourinary:     Penis: bruising along left lateral penile shaft with tenderness      Scrotum/Testes: Normal.     Comments:  Abdominal:     Palpations: Abdomen is soft. There is no mass.     Tenderness: There is no tenderness. There is no guarding or rebound.     Hernia: small hernia is present.     Comments: Well healed incisions    Assessment/Plan   63 year old male referred by Dr. Walker for elevated PSA. Hx of prostatitis, HTN, UTIs. MRI Prostate (4/3/23) showed PI-RADS 4 lesion in the right posterolateral PZ prostate apex, without evidence of extraprostatic extension. S/p TP prostate biopsy (5/16/23) with pathology showing prostatic adenocarcinoma, GG3 ([place] 4+3=7), 2/2 cores (65% of tissue), LEEANNA #1 GG2 ([place] 3+4=7), 4/5 cores (80% of tissue), pattern 4, ASAP, perineural invasion, HGPIN. S/p biopsy hematuria. PSMA/PET (6/16/23) showed heterogenous Ga68 PSMA uptake throughout the prostate gland with focally increased Ga68 PSMA expression within the apical prostate gland consistent with biopsy-proven prostate cancer, no evidence  for pelvic lymph node metastasis, no evidence for distant metastatic disease including osseous metastases. S/p robotic assisted laparoscopic prostatectomy with bilateral pelvic lymph node dissection (9/26/23) with pathology showing prostatic adenocarcinoma, GG2 ([place] score 3+4=7), 6-10% pattern 4, lymph nodes neg. PSA <0.10 (5/13/24). Patient has seen Dr. Pearson for ICI (5/14/24) and experienced bruising and pain after injections. ED able to obtain erections with Cialis, but experiences facial blotchy erythema. Doing well. On PE, patient had tenderness and bruising along left lateral penile shaft. Management options including risks, benefits and alternatives discussed at length and all questions answered. Patient prefers to proceed with follow-up in 6mos with PSA.     Rx vacuum erection device and penile constriction ring.     Will consult with Dr. Pearson on ICI placement reaction and possible candidate for IPP.           Scribe Attestation  By signing my name below, INereida Scribe   attest that this documentation has been prepared under the direction and in the presence of Vinicio Robles MD.

## 2024-06-13 ENCOUNTER — TELEPHONE (OUTPATIENT)
Dept: PRIMARY CARE | Facility: CLINIC | Age: 64
End: 2024-06-13
Payer: COMMERCIAL

## 2024-06-13 DIAGNOSIS — I10 ESSENTIAL HYPERTENSION, BENIGN: ICD-10-CM

## 2024-06-14 RX ORDER — LOSARTAN POTASSIUM 50 MG/1
50 TABLET ORAL DAILY
Qty: 90 TABLET | Refills: 3 | Status: SHIPPED | OUTPATIENT
Start: 2024-06-14

## 2024-07-06 DIAGNOSIS — M17.12 PRIMARY OSTEOARTHRITIS OF LEFT KNEE: ICD-10-CM

## 2024-07-08 RX ORDER — MELOXICAM 15 MG/1
15 TABLET ORAL DAILY
Qty: 30 TABLET | Refills: 1 | Status: SHIPPED | OUTPATIENT
Start: 2024-07-08

## 2024-09-19 ENCOUNTER — APPOINTMENT (OUTPATIENT)
Dept: PRIMARY CARE | Facility: CLINIC | Age: 64
End: 2024-09-19
Payer: COMMERCIAL

## 2024-09-26 ENCOUNTER — APPOINTMENT (OUTPATIENT)
Dept: PRIMARY CARE | Facility: CLINIC | Age: 64
End: 2024-09-26
Payer: COMMERCIAL

## 2024-09-26 VITALS
SYSTOLIC BLOOD PRESSURE: 132 MMHG | BODY MASS INDEX: 27.86 KG/M2 | HEIGHT: 71 IN | WEIGHT: 199 LBS | DIASTOLIC BLOOD PRESSURE: 80 MMHG | OXYGEN SATURATION: 98 % | HEART RATE: 73 BPM

## 2024-09-26 DIAGNOSIS — Z23 IMMUNIZATION DUE: Primary | ICD-10-CM

## 2024-09-26 PROCEDURE — 90471 IMMUNIZATION ADMIN: CPT | Performed by: STUDENT IN AN ORGANIZED HEALTH CARE EDUCATION/TRAINING PROGRAM

## 2024-09-26 PROCEDURE — 3079F DIAST BP 80-89 MM HG: CPT | Performed by: STUDENT IN AN ORGANIZED HEALTH CARE EDUCATION/TRAINING PROGRAM

## 2024-09-26 PROCEDURE — 90715 TDAP VACCINE 7 YRS/> IM: CPT | Performed by: STUDENT IN AN ORGANIZED HEALTH CARE EDUCATION/TRAINING PROGRAM

## 2024-09-26 PROCEDURE — 99214 OFFICE O/P EST MOD 30 MIN: CPT | Performed by: STUDENT IN AN ORGANIZED HEALTH CARE EDUCATION/TRAINING PROGRAM

## 2024-09-26 PROCEDURE — 90656 IIV3 VACC NO PRSV 0.5 ML IM: CPT | Performed by: STUDENT IN AN ORGANIZED HEALTH CARE EDUCATION/TRAINING PROGRAM

## 2024-09-26 PROCEDURE — 3008F BODY MASS INDEX DOCD: CPT | Performed by: STUDENT IN AN ORGANIZED HEALTH CARE EDUCATION/TRAINING PROGRAM

## 2024-09-26 PROCEDURE — 3075F SYST BP GE 130 - 139MM HG: CPT | Performed by: STUDENT IN AN ORGANIZED HEALTH CARE EDUCATION/TRAINING PROGRAM

## 2024-09-26 PROCEDURE — 90472 IMMUNIZATION ADMIN EACH ADD: CPT | Performed by: STUDENT IN AN ORGANIZED HEALTH CARE EDUCATION/TRAINING PROGRAM

## 2024-09-26 PROCEDURE — 1036F TOBACCO NON-USER: CPT | Performed by: STUDENT IN AN ORGANIZED HEALTH CARE EDUCATION/TRAINING PROGRAM

## 2024-09-26 ASSESSMENT — PATIENT HEALTH QUESTIONNAIRE - PHQ9
2. FEELING DOWN, DEPRESSED OR HOPELESS: NOT AT ALL
SUM OF ALL RESPONSES TO PHQ9 QUESTIONS 1 AND 2: 0
1. LITTLE INTEREST OR PLEASURE IN DOING THINGS: NOT AT ALL

## 2024-09-26 ASSESSMENT — PAIN SCALES - GENERAL: PAINLEVEL: 0-NO PAIN

## 2024-09-26 NOTE — PATIENT INSTRUCTIONS
Flu and Tetanus shot today     Consider RSV vaccine and updated COVID booster through your pharamcy    Labs in February (due at time of your visit)  PSA followed by urology     Consider a sleep study--let me know if you would like to proceed.    Nice to meet you today!    Dr. CARDENAS

## 2024-09-26 NOTE — PROGRESS NOTES
Buddy Izquierdo is a 64 y.o. male [place] [facility] [place] for routine care, as well as for management of the following chronic medical conditions: history of prostate cancer, HTN, DLD (mild), meniscal injury. Patient presents today to establish care.     #HTN  - Losartan 50mg daily  - typically 130s/80s  - manual in office today 132/80  - reassuring renal function panel    #Concern for JIMMY  - Elevated STOP BANG score 5: snoring, neck circumference, male gender, age, treatment for HTN  [  ] consider sleep study, he will think on this and let me know     #Prostate Cancer  #History of Prostatitis, UTI  - follows with urology   - diagnosed 2023   - MRI Prostate (4/3/23): 18.5g; PI-RADS 4 lesion in the right posterolateral PZ prostate apex, without evidence of extraprostatic extension.  - PSMA/PET (6/16/23): heterogenous Ga68 PSMA uptake throughout the prostate gland with focally increased Ga68 PSMA expression within the apical prostate gland consistent with biopsy-proven prostate cancer, no evidence for pelvic lymph node metastasis, no evidence for distant metastatic disease including osseous metastases  - S/p robotic assisted laparoscopic prostatectomy with bilateral pelvic lymph node dissection (9/26/23) with pathology showing prostatic adenocarcinoma, GG2 (Neil score 3+4=7), 6-10% pattern 4, lymph nodes neg  - Patient has seen Dr. Pearson for ICI (5/14/24) and experienced bruising and pain after injections; ED able to obtain erections with Cialis, but experiences facial blotchy erythema  [  ] follow up Q6 months with PSA     #L Knee Pain, DJD, Meniscal Tear   - seen by ortho, sports medicine  - prior Synvisc injection with good response   - planning for eventual surgery   - prior MRI with large lateral meniscus tear   - orthopedic brace advised for active lifestyle   - Meloxicam for pain (takes functionally as needed)     #DLD  - mild per labs 02/2024   - CT Cardiac  scoring 2022 with results 3.8  [  ] discuss moderate intensity statin     #s/p Laparoscopic Right Inguinal Hernia Repair  - no ongoing issues or concerns     Past Medical History: as above   Past Medical History:   Diagnosis Date    Bee allergy status 09/03/2019    History of bee sting allergy    Dorsalgia, unspecified 07/22/2015    Back pain, acute    Encounter for general adult medical examination without abnormal findings 12/18/2017    Blood tests for routine general physical examination    Encounter for immunization     Encounter for immunization    Encounter for other orthopedic aftercare 10/26/2020    Orthopedic aftercare    Encounter for screening for malignant neoplasm of colon 09/09/2021    Colon cancer screening    Encounter for screening for malignant neoplasm of prostate 12/17/2017    Prostate cancer screening    Encounter for screening for other viral diseases 12/17/2017    Need for hepatitis C screening test    Iliotibial band syndrome, left leg 12/28/2020    Iliotibial band tendinitis of left side    Other tear of lateral meniscus, current injury, left knee, initial encounter 12/14/2020    Tear of lateral meniscus of left knee, unspecified tear type, unspecified whether old or current tear, initial encounter    Pain in left hip 08/10/2018    Left hip pain    Pain in left knee 10/06/2020    Left knee pain    Pain in left knee 09/27/2021    Left knee pain    Pain in left shoulder 08/28/2014    Left shoulder pain    Pain in unspecified ankle and joints of unspecified foot 12/18/2017    Ankle pain    Pain in unspecified elbow 12/18/2017    Elbow pain    Personal history of other diseases of male genital organs 12/18/2017    History of epididymitis    Personal history of other diseases of the respiratory system 11/25/2020    History of upper respiratory infection    Strain of muscle, fascia and tendon of the posterior muscle group at thigh level, left thigh, initial encounter 08/22/2018    Left hamstring  muscle strain, initial encounter    Unspecified abdominal pain 07/22/2015    Abdominal cramping    Unspecified dislocation of unspecified acromioclavicular joint, initial encounter 08/29/2014    AC separation     Subspecialty Medical Care: urology (Dr. ATA Robles), Orthopedics, Sports Medicine     Past Surgical History: as above   No past surgical history on file.    Medications:  Current Outpatient Medications:     losartan (Cozaar) 50 mg tablet, Take 1 tablet (50 mg) by mouth once daily., Disp: 90 tablet, Rfl: 3    meloxicam (Mobic) 15 mg tablet, TAKE 1 TABLET BY MOUTH EVERY DAY, Disp: 30 tablet, Rfl: 1    Senna Laxative 8.6 mg tablet, , Disp: , Rfl:     sildenafil (Viagra) 25 mg tablet, Take 1 tablet (25 mg) by mouth once daily., Disp: 90 tablet, Rfl: 3    tadalafil (Cialis) 5 mg tablet, Take 5mg daily and one day a week take 20mg instead of daily 5mg dose, Disp: 90 tablet, Rfl: 3  Pharmacy: [facility] [place] [facility]     Allergies: NKDA   No Known Allergies    Immunizations:   - Flu annually--advised today  - recommend staying UTD with COVID booster  - Tdap today (new grandchild)  - RSV advised   - PCV-20 at 65  - Shingrix advised     Family History:   - father still living at 93  - mother with history of HTN, tobacco and alcohol use, CHF, ESRD on HD, T2DM  Family History   Problem Relation Name Age of Onset    Cancer Other      Diabetes Other      Hypertension Other      Heart disease Other       Social History:   Home/Living Situation/Falls/Safety Assessment: lives at home with his wife; three children, from [place]   Education/Employment/Work/Vocational: still working, 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, [employer])   Activities: very physically active   Drug Use: never smoker, daily (1-2 drinks per day)   Diet: pretty good dietary habits  Depression/Anxiety:   Sexuality/Contraception/Menstrual History:   Sleep: no sleep concerns, +snoring, STOP-BANG     Patient Information:  Health  "Insurance: has insurance   Transportation: drives   Healthcare POA/Guardian: wife  Contact Information: correct in EMR     Visit Vitals  /80 (BP Location: Left arm, Patient Position: Sitting, BP Cuff Size: Adult)   Pulse 73   Ht 1.803 m (5' 11\")   Wt 90.3 kg (199 lb)   SpO2 98%   BMI 27.75 kg/m²   Smoking Status Never   BSA 2.13 m²      PHYSICAL EXAM:   General: well appearing  male, NAD, pleasant and engaged in encounter    HEENT: NCAT, MMM, large neck circumference   CV: RRR, no m/r/g  PULM: CTAB, non-labored respirations   ABD: soft, NT, ND, + bowel sounds   : no suprapubic or CVA tenderness   EXT: WWP, no significant edema   SKIN: no rashes noted   NEURO: A&Ox4, symmetric facies, no gross motor or sensory deficits, normal gait  PSYCH: pleasant mood, appropriate affect     Assessment/Plan    Buddy Izquierdo is a 64 y.o. male seen in Clinic at [facility] [place] by Dr. Lincoln Buchanan on 09/26/24 for routine care, as well as for management of the following chronic medical conditions: history of prostate cancer, HTN, DLD (mild), meniscal injury. Patient presents today to establish care.     #HTN  - Losartan 50mg daily  - typically 130s/80s  - manual in office today 132/80  - reassuring renal function panel    #Concern for JIMMY  - Elevated STOP BANG score 5: snoring, neck circumference, male gender, age, treatment for HTN  [  ] consider sleep study, he will think on this and let me know     #Prostate Cancer  #History of Prostatitis, UTI  - follows with urology   - diagnosed 2023   - MRI Prostate (4/3/23): 18.5g; PI-RADS 4 lesion in the right posterolateral PZ prostate apex, without evidence of extraprostatic extension.  - PSMA/PET (6/16/23): heterogenous Ga68 PSMA uptake throughout the prostate gland with focally increased Ga68 PSMA expression within the apical prostate gland consistent with biopsy-proven prostate cancer, no evidence for pelvic lymph node metastasis, no evidence for distant metastatic disease " including osseous metastases  - S/p robotic assisted laparoscopic prostatectomy with bilateral pelvic lymph node dissection (9/26/23) with pathology showing prostatic adenocarcinoma, GG2 ([place] score 3+4=7), 6-10% pattern 4, lymph nodes neg  - Patient has seen Dr. Pearson for ICI (5/14/24) and experienced bruising and pain after injections; ED able to obtain erections with Cialis, but experiences facial blotchy erythema  [  ] follow up Q6 months with PSA     #L Knee Pain, DJD, Meniscal Tear   - seen by ortho, sports medicine  - prior Synvisc injection with good response   - planning for eventual surgery   - prior MRI with large lateral meniscus tear   - orthopedic brace advised for active lifestyle   - Meloxicam for pain (takes functionally as needed)     #DLD  - mild per labs 02/2024   - CT Cardiac scoring 2022 with results 3.8  [  ] discuss moderate intensity statin     #s/p Laparoscopic Right Inguinal Hernia Repair  - no ongoing issues or concerns     #Health Maintenance    Cancer Screening  - Colorectal Cancer Screening: due 04/2026  - Lung Cancer Screening: non-smoker   - Prostate Cancer Screening: follows with urology Q6 month PSA     Laboratory Screening  - Lipid Screen: mild DLD as above   - ASCVD Score:   - A1C, glucose screen: 5.5%  - STI, HIV, Hep B screen: defers   - Hep C screen: defers    Imaging Screening:   - AAA screening: consider at 65     Immunizations:   - Flu annually--advised today  - recommend staying UTD with COVID booster  - Tdap today (new grandchild)  - RSV advised   - PCV-20 at 65  - Shingrix advised     Other Screening  - Health Literacy Assessment: excellent   - Depression screen: negative  - Home safety/partner violence screen: negative   - Hearing/Vision screens:   - Alcohol/tobacco/drug use screen: non-smoker  - Healthcare POA/Advanced Directives: wife     Referrals: labs in 02/2025, Flu and Tdap today, RSV and COVID through pharmacy, consider sleep study    Return to  clinic in February 2025 for CPE and  follow-up.    Patient Discussion:    Please call back the office with any questions at [phone]. In the case of an emergency, please call 911 or go to the nearest Emergency Department.      Lincoln Buchanan MD  Internal Medicine-Pediatrics  [facility] [place] [address], [address]  P: [phone], F: [phone]

## 2024-11-06 ENCOUNTER — APPOINTMENT (OUTPATIENT)
Dept: PRIMARY CARE | Facility: CLINIC | Age: 64
End: 2024-11-06
Payer: COMMERCIAL

## 2024-11-25 ENCOUNTER — LAB (OUTPATIENT)
Dept: LAB | Facility: LAB | Age: 64
End: 2024-11-25
Payer: COMMERCIAL

## 2024-11-25 DIAGNOSIS — C61 MALIGNANT NEOPLASM OF PROSTATE (MULTI): ICD-10-CM

## 2024-11-25 LAB — PSA SERPL-MCNC: <0.1 NG/ML

## 2024-11-25 PROCEDURE — 36415 COLL VENOUS BLD VENIPUNCTURE: CPT

## 2024-11-25 PROCEDURE — 84153 ASSAY OF PSA TOTAL: CPT

## 2024-12-04 NOTE — PROGRESS NOTES
HPI:  Proc (5/16/23): TP prostate biopsy   Path: prostatic adenocarcinoma, GG3 ([place] 4+3=7), 2/2 cores (65% of tissue), LEEANNA #1 GG2 ([place] 3+4=7), 4/5 cores (80% of tissue), pattern 4, ASAP, perineural invasion, HGPIN    Proc (9/26/23): robotic assisted laparoscopic prostatectomy with bilateral pelvic lymph node dissection   Path: prostatic adenocarcinoma, GG2 ([place] score 3+4=7), 6-10% pattern 4, lymph nodes neg     Buddy Izquierdo is a 64 y.o. male referred by Dr. Walker for elevated PSA. Hx of prostatitis, HTN, UTIs. MRI Prostate (4/3/23) showed PI-RADS 4 lesion in the right posterolateral PZ prostate apex, without evidence of extraprostatic extension. S/p TP prostate biopsy (5/16/23) with pathology showing prostatic adenocarcinoma, GG3 ([place] 4+3=7), 2/2 cores (65% of tissue), LEEANNA #1 GG2 ([place] 3+4=7), 4/5 cores (80% of tissue), pattern 4, ASAP, perineural invasion, HGPIN. S/p biopsy hematuria. PSMA/PET (6/16/23) showed heterogenous Ga68 PSMA uptake throughout the prostate gland with focally increased Ga68 PSMA expression within the apical prostate gland consistent with biopsy-proven prostate cancer, no evidence for pelvic lymph node metastasis, no evidence for distant metastatic disease including osseous metastases. S/p robotic assisted laparoscopic prostatectomy with bilateral pelvic lymph node dissection (9/26/23) with pathology showing prostatic adenocarcinoma, GG2 ([place] score 3+4=7), 6-10% pattern 4, lymph nodes neg. Patient has seen Dr. Pearson for ICI (5/14/24) and experienced bruising and pain after injections. Reports facial blotchy erythema and heartburn with Cialis. Good urinary function, not wearing pad or diaper. Erections sufficient for intercourse without Cialis. Reports intermittent left dull hip pain, worsens with movement.      PSA: <0.10 (11/25/24), <0.10 (5/13/24), <0.10 (2/12/24), <0.10 (11/16/23), 5.53 (2/23/23)     MRI Prostate (4/3/23): 18.5g  PI-RADS 4 lesion in  the right posterolateral PZ prostate apex, without evidence of extraprostatic extension.     PSMA/PET (6/16/23): heterogenous Ga68 PSMA uptake throughout the prostate gland with focally increased Ga68 PSMA expression within the apical prostate gland consistent with biopsy-proven prostate cancer, no evidence for pelvic lymph node metastasis, no evidence for distant metastatic disease including osseous metastases.    Review of Systems:  All systems reviewed. Anything negative noted in the HPI.    Physical Exam:  Vitals signs reviewed.  Constitutional:      Appearance: Well-developed.  HENT:     Head: Normocephalic and atraumatic.  Neck:     Musculoskeletal: Normal range of motion.  Pulmonary:     Effort: Pulmonary effort is normal.  Musculoskeletal: Normal range of motion.  Skin:     General: Skin is warm and dry.  Neurological:     Mental Status: Alert and oriented to person, place, and time.  Psychiatric:        Behavior: Behavior normal.        Thought Content: Thought content normal.        Judgment: Judgment normal.  Genitourinary:     Penis: Normal.      Scrotum/Testes: Normal.     Comments:  Abdominal:     Palpations: Abdomen is soft. There is no mass.     Tenderness: There is no tenderness. There is no guarding or rebound.     Hernia: No hernia is present.     Comments:     Assessment/Plan   Buddy Izquierdo is a 64 y.o. male referred by Dr. Walker for elevated PSA. Hx of prostatitis, HTN, UTIs. MRI Prostate (4/3/23) showed PI-RADS 4 lesion in the right posterolateral PZ prostate apex, without evidence of extraprostatic extension. S/p TP prostate biopsy (5/16/23) with pathology showing prostatic adenocarcinoma, GG3 ([place] 4+3=7), 2/2 cores (65% of tissue), LEEANNA #1 GG2 ([place] 3+4=7), 4/5 cores (80% of tissue), pattern 4, ASAP, perineural invasion, HGPIN. S/p biopsy hematuria. PSMA/PET (6/16/23) showed heterogenous Ga68 PSMA uptake throughout the prostate gland with focally increased Ga68 PSMA expression  within the apical prostate gland consistent with biopsy-proven prostate cancer, no evidence for pelvic lymph node metastasis, no evidence for distant metastatic disease including osseous metastases. S/p robotic assisted laparoscopic prostatectomy with bilateral pelvic lymph node dissection (9/26/23) with pathology showing prostatic adenocarcinoma, GG2 ([place] score 3+4=7), 6-10% pattern 4, lymph nodes neg. Patient has seen Dr. Pearson for ICI (5/14/24) and experienced bruising and pain after injections. Reports facial blotchy erythema and heartburn with Cialis. Good urinary function, not wearing pad or diaper. Erections sufficient for intercourse without Cialis. Reports intermittent left dull hip pain, worsens with movement. Management options including risks, benefits and alternatives discussed at length and all questions answered. Patient prefers to proceed with renal US and will follow-up in 6mos with PSA.     Will refer to PM&R for low back pain.           Scribe Attestation  By signing my name below, Nereida HUYNH Scribe   attest that this documentation has been prepared under the direction and in the presence of Vinicio Robles MD.

## 2024-12-05 ENCOUNTER — APPOINTMENT (OUTPATIENT)
Dept: UROLOGY | Facility: CLINIC | Age: 64
End: 2024-12-05
Payer: COMMERCIAL

## 2024-12-05 VITALS — WEIGHT: 200 LBS | BODY MASS INDEX: 28 KG/M2 | HEIGHT: 71 IN | TEMPERATURE: 96.5 F

## 2024-12-05 DIAGNOSIS — R10.9 FLANK PAIN: ICD-10-CM

## 2024-12-05 DIAGNOSIS — C61 MALIGNANT NEOPLASM OF PROSTATE (MULTI): Primary | ICD-10-CM

## 2024-12-05 DIAGNOSIS — M54.9 BACK PAIN, UNSPECIFIED BACK LOCATION, UNSPECIFIED BACK PAIN LATERALITY, UNSPECIFIED CHRONICITY: ICD-10-CM

## 2024-12-05 PROCEDURE — 3008F BODY MASS INDEX DOCD: CPT | Performed by: STUDENT IN AN ORGANIZED HEALTH CARE EDUCATION/TRAINING PROGRAM

## 2024-12-05 PROCEDURE — 99214 OFFICE O/P EST MOD 30 MIN: CPT | Performed by: STUDENT IN AN ORGANIZED HEALTH CARE EDUCATION/TRAINING PROGRAM

## 2024-12-05 ASSESSMENT — PAIN SCALES - GENERAL: PAINLEVEL_OUTOF10: 0-NO PAIN

## 2024-12-09 ENCOUNTER — HOSPITAL ENCOUNTER (OUTPATIENT)
Dept: RADIOLOGY | Facility: HOSPITAL | Age: 64
Discharge: HOME | End: 2024-12-09
Payer: COMMERCIAL

## 2024-12-09 DIAGNOSIS — R10.9 FLANK PAIN: ICD-10-CM

## 2024-12-09 PROCEDURE — 76770 US EXAM ABDO BACK WALL COMP: CPT

## 2024-12-09 PROCEDURE — 76770 US EXAM ABDO BACK WALL COMP: CPT | Performed by: STUDENT IN AN ORGANIZED HEALTH CARE EDUCATION/TRAINING PROGRAM

## 2024-12-17 ENCOUNTER — OFFICE VISIT (OUTPATIENT)
Dept: ORTHOPEDIC SURGERY | Facility: HOSPITAL | Age: 64
End: 2024-12-17
Payer: COMMERCIAL

## 2024-12-17 DIAGNOSIS — M17.12 PRIMARY OSTEOARTHRITIS OF LEFT KNEE: Primary | ICD-10-CM

## 2024-12-17 PROCEDURE — 99213 OFFICE O/P EST LOW 20 MIN: CPT | Performed by: FAMILY MEDICINE

## 2024-12-17 NOTE — PROGRESS NOTES
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    On 2/13/2024, he presents for evaluation of left knee pain and swelling upon referral by Dr. Darnell. He is very active adult individual with knee pain.  He skis, play soccer, rides a bicycle, but has stopped running due to his knee pain.  He recently was seen by Dr. Darnell on 3/26/2023 for his knee.  They discussed the need for eventual surgery.  He had an MRI which revealed a large lateral meniscus tear.  Unfortunately, he was diagnosed with prostate cancer and recently underwent surgical treatment and this delayed his knee treatment.  Recently, at the end of January he was on an alpine skiing trip and developed swelling behind the knee.  He treated it with compression and ice and the symptoms improved.  He called into his knee physician and was referred to me for further nonsurgical treatments including possible Baker's cyst aspiration.  Today he reports his symptoms are significantly improved but wanted to keep his appointment.  He denies any further problems with the left knee.  Is no problems with the right knee.  We agreed to continue to treat his left knee degenerative joint disease with a degenerative meniscus tear conservatively at this time.  We agreed that he did not need any joint or Baker's cyst aspiration today nor does he need a cortisone injection.  He was very interested in viscosupplementation and was given information to call my  at his leisure to pursue insurance approval.  We agreed upon an off-the-shelf orthopedic brace for his active lifestyle.  He was started on prescription meloxicam and will stop his OTC ibuprofen.  He will follow-up in the next 1 to 2 months to start gel injections.    On 3/13/2024, he returns for follow-up of left knee DJD and to start a trial of  viscosupplementation with Synvisc 1.  He is requesting long-term analgesia.  He reports the left knee  brace works great.  He denies interval injury or trauma.  We agreed upon a trial of Synvisc 1 for his left knee DJD.  He tolerated this well.  Activity modifications were reviewed.  He will continue with his knee brace and his other conservative treatment recommendations.  He will follow-up in 8 weeks or sooner for recheck.    On 5/8/2024, he returns for an 8-week follow-up status post a trial of Synvisc 1 viscosupplementation for left knee DJD.  Overall he feels 80% better.  He is able to ski and walk 3 to 5 miles without any pain.  He is wearing his brace daily.  He is cycling without pain.  He is very happy with his improvement.  He denies interval injury or trauma.We agreed that he responded very well to the trial of Synvisc 1 viscosupplementation for left knee DJD.  He understands this can be repeated every 6 months or later as needed.  He will continue his current conservative treatment plan of wearing his brace.  He will follow-up when his pain returns.    Today, 12/17/2024, he presents for follow-up of his chronic left knee pain.  His last injection was on 5/8/2024 when he had Synvisc.  He is still biking and having no pain, a minimal amount of swelling, but was curious about if he was due for another injection before pain started because he is going on a skiing trip and he was worried about the amount of time it takes for the shot to kick in.  He has had steroids in the past with Dr. Darnell but cannot recall how well they worked.  Denies any new trauma or injury.  Denies any swelling or erythema at this time.    He has no other complaints.    Physical Examination:     The LEFT knee is without obvious signs of acute bony deformity, swelling, erythema, or ecchymosis.  There is no effusion.  The patella is without tenderness. Apprehension is negative with medial and lateral glide. Patella crepitus  is positive.  The medial joint line is nontender and without bony crepitus or step-off. The lateral joint line is no longer tender and without bony crepitus or step-off. Flexion & extension are full and symmetrical.  There is no instability with stress testing.  The opposite knee is nontender and stable. Gait is pain-free and tandem.    Imaging:  outside MRI findings which revealed a large lateral meniscus tear.  === 03/06/23 ===  KNEE  - Impression -  Minimal to mild degenerative change of the knees without osseous  injury evident.      Problem List Items Addressed This Visit             ICD-10-CM    Primary osteoarthritis of left knee - Primary M17.12         Assessment and Plan:     We reviewed the exam and x-ray findings and discussed the conservative and surgical treatment options.  We agreed that holding off for now due to patient not having any pain whatsoever would be most appropriate, and if he does have recurrence of his pain and is looking for something for his ski trip that has a faster onset, could provide him with a corticosteroid injection.  Will be happy to repeat viscosupplementation when pain begins to recur.  We will have him follow-up as needed.    Enrique Garza DO  [facility] Fellow     **This note was dictated using Dragon speech recognition software and was not corrected for spelling or grammatical errors**.

## 2025-01-10 ENCOUNTER — OFFICE VISIT (OUTPATIENT)
Dept: ORTHOPEDIC SURGERY | Facility: HOSPITAL | Age: 65
End: 2025-01-10
Payer: COMMERCIAL

## 2025-01-10 ENCOUNTER — HOSPITAL ENCOUNTER (OUTPATIENT)
Dept: RADIOLOGY | Facility: EXTERNAL LOCATION | Age: 65
Discharge: HOME | End: 2025-01-10

## 2025-01-10 DIAGNOSIS — M17.12 PRIMARY OSTEOARTHRITIS OF LEFT KNEE: ICD-10-CM

## 2025-01-10 PROCEDURE — 20611 DRAIN/INJ JOINT/BURSA W/US: CPT | Mod: LT | Performed by: FAMILY MEDICINE

## 2025-01-10 PROCEDURE — 99214 OFFICE O/P EST MOD 30 MIN: CPT | Performed by: FAMILY MEDICINE

## 2025-01-10 PROCEDURE — 2500000004 HC RX 250 GENERAL PHARMACY W/ HCPCS (ALT 636 FOR OP/ED): Mod: JZ | Performed by: FAMILY MEDICINE

## 2025-01-10 PROCEDURE — 99214 OFFICE O/P EST MOD 30 MIN: CPT | Mod: 25 | Performed by: FAMILY MEDICINE

## 2025-01-10 RX ADMIN — Medication 48 MG: at 12:02

## 2025-01-10 ASSESSMENT — PAIN SCALES - GENERAL: PAINLEVEL_OUTOF10: 5 - MODERATE PAIN

## 2025-01-10 ASSESSMENT — PAIN - FUNCTIONAL ASSESSMENT: PAIN_FUNCTIONAL_ASSESSMENT: 0-10

## 2025-01-10 NOTE — PROGRESS NOTES
Patient ID: Buddy Izquierdo is a 64 y.o. male.    L Inj/Asp: L knee on 1/10/2025 12:02 PM  Indications: pain  Details: 21 G needle, ultrasound-guided superolateral approach  Medications: 48 mg hylan 48 mg/6 mL  Outcome: tolerated well, no immediate complications  Procedure, treatment alternatives, risks and benefits explained, specific risks discussed. Consent was given by the patient. Immediately prior to procedure a time out was called to verify the correct patient, procedure, equipment, support staff and site/side marked as required. Patient was prepped and draped in the usual sterile fashion.       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    On 2/13/2024, he presents for evaluation of left knee pain and swelling upon referral by Dr. Darnell. He is very active adult individual with knee pain.  He skis, play soccer, rides a bicycle, but has stopped running due to his knee pain.  He recently was seen by Dr. Darnell on 3/26/2023 for his knee.  They discussed the need for eventual surgery.  He had an MRI which revealed a large lateral meniscus tear.  Unfortunately, he was diagnosed with prostate cancer and recently underwent surgical treatment and this delayed his knee treatment.  Recently, at the end of January he was on an alpine skiing trip and developed swelling behind the knee.  He treated it with compression and ice and the symptoms improved.  He called into his knee physician and was referred to me for further nonsurgical treatments including possible Baker's cyst aspiration.  Today he reports his symptoms are significantly improved but wanted to keep his appointment.  He denies any further problems with the left knee.  Is no problems with the right knee.  We agreed to continue to treat his left knee degenerative joint disease with a  degenerative meniscus tear conservatively at this time.  We agreed that he did not need any joint or Baker's cyst aspiration today nor does he need a cortisone injection.  He was very interested in viscosupplementation and was given information to call my  at his leisure to pursue insurance approval.  We agreed upon an off-the-shelf orthopedic brace for his active lifestyle.  He was started on prescription meloxicam and will stop his OTC ibuprofen.  He will follow-up in the next 1 to 2 months to start gel injections.    On 3/13/2024, he returns for follow-up of left knee DJD and to start a trial of viscosupplementation with Synvisc 1.  He is requesting long-term analgesia.  He reports the left knee  brace works great.  He denies interval injury or trauma.  We agreed upon a trial of Synvisc 1 for his left knee DJD.  He tolerated this well.  Activity modifications were reviewed.  He will continue with his knee brace and his other conservative treatment recommendations.  He will follow-up in 8 weeks or sooner for recheck.    On 5/8/2024, he returns for an 8-week follow-up status post a trial of Synvisc 1 viscosupplementation for left knee DJD.  Overall he feels 80% better.  He is able to ski and walk 3 to 5 miles without any pain.  He is wearing his brace daily.  He is cycling without pain.  He is very happy with his improvement.  He denies interval injury or trauma.We agreed that he responded very well to the trial of Synvisc 1 viscosupplementation for left knee DJD.  He understands this can be repeated every 6 months or later as needed.  He will continue his current conservative treatment plan of wearing his brace.  He will follow-up when his pain returns.    12/17/2024, he presents for follow-up of his chronic left knee pain.  His last injection was on 5/8/2024 when he had Synvisc.  He is still biking and having no pain, a minimal amount of swelling, but was curious about if he was due for another  injection before pain started because he is going on a skiing trip and he was worried about the amount of time it takes for the shot to kick in.  He has had steroids in the past with Dr. Darnell but cannot recall how well they worked.  Denies any new trauma or injury.  Denies any swelling or erythema at this time.  We agreed that holding off for now due to patient not having any pain whatsoever would be most appropriate, and if he does have recurrence of his pain and is looking for something for his ski trip that has a faster onset, could provide him with a corticosteroid injection.  Will be happy to repeat viscosupplementation when pain begins to recur.  We will have him follow-up as needed.    Today, 1/10/2025, he returns for follow-up of chronic left knee pain and to repeat Synvisc injection for chronic DJD pain.  He denies interval injury or trauma.  He is getting ready to go on a skiing trip.  He is hoping for long-term analgesia.    He has no other complaints.    Physical Examination:     The RIGHT knee is nontender and stable.  The LEFT knee has trace effusion. Patella crepitus and grind are positive. There is minimal tenderness to the medial and lateral joint lines. Flexion and extension are without mechanical blocking. There is no instability with stress testing.   Skin - no rashes, sores, or open lesions. Strength, sensory and vascular exams are otherwise normal. There is no clubbing, cyanosis or edema.  Gait is slightly antalgic and tandem.    Imaging:  outside MRI findings which revealed a large lateral meniscus tear.  === 03/06/23 ===  KNEE  - Impression -  Minimal to mild degenerative change of the knees without osseous  injury evident.      Procedure Note:    After consent was obtained, the LEFT knee was prepped in a sterile fashion. Ultrasound guidance was used to help insure proper needle placement into the knee joint, decrease patient discomfort, and decrease collateral damage. The joint was  visualized and Synvisc 1 was injected without any complications. Ultrasound images were saved on an internal file for later reference. The patient tolerated the procedure well and the area was cleaned and bandaged.    Problem List Items Addressed This Visit             ICD-10-CM    Primary osteoarthritis of left knee M17.12    Relevant Orders    Point of Care Ultrasound (Completed)         Assessment and Plan:     We reviewed the exam and x-ray findings and discussed the conservative and surgical treatment options.  We agreed upon repeating Synvisc 1/viscosupplementation injection into the left knee for chronic DJD pain.  He tolerated this well.  Activity modifications were reviewed.  He will continue his current conservative treatment plan.  Will follow-up when his pain returns.    **This note was dictated using Dragon speech recognition software and was not corrected for spelling or grammatical errors**.

## 2025-01-29 DIAGNOSIS — M17.12 PRIMARY OSTEOARTHRITIS OF LEFT KNEE: ICD-10-CM

## 2025-01-29 DIAGNOSIS — C61 PROSTATE CANCER (MULTI): ICD-10-CM

## 2025-01-29 RX ORDER — MELOXICAM 15 MG/1
15 TABLET ORAL DAILY
Qty: 30 TABLET | Refills: 1 | Status: SHIPPED | OUTPATIENT
Start: 2025-01-29

## 2025-01-29 RX ORDER — TADALAFIL 5 MG/1
TABLET ORAL
Qty: 90 TABLET | Refills: 3 | Status: SHIPPED | OUTPATIENT
Start: 2025-01-29

## 2025-04-24 ENCOUNTER — TELEPHONE (OUTPATIENT)
Dept: UROLOGY | Facility: CLINIC | Age: 65
End: 2025-04-24
Payer: COMMERCIAL

## 2025-04-24 ENCOUNTER — HOSPITAL ENCOUNTER (OUTPATIENT)
Dept: RADIOLOGY | Facility: CLINIC | Age: 65
Discharge: HOME | End: 2025-04-24
Payer: COMMERCIAL

## 2025-04-24 DIAGNOSIS — M25.551 PAIN IN RIGHT HIP: ICD-10-CM

## 2025-04-24 PROCEDURE — 73502 X-RAY EXAM HIP UNI 2-3 VIEWS: CPT | Mod: RT

## 2025-04-24 NOTE — TELEPHONE ENCOUNTER
VM left informing patient to get another PSA blood draw. If below >0.10 pain is not related to prostate cancer. Also to follow-up on with Dr. Josy Hastings.

## 2025-04-25 LAB — PSA SERPL-MCNC: 0.05 NG/ML

## 2025-05-28 DIAGNOSIS — I10 ESSENTIAL HYPERTENSION, BENIGN: Primary | ICD-10-CM

## 2025-05-28 NOTE — PROGRESS NOTES
HPI:  Proc (5/16/23): TP prostate biopsy   Path: prostatic adenocarcinoma, GG3 (Neil 4+3=7), 2/2 cores (65% of tissue), LEEANNA #1 GG2 ([place] 3+4=7), 4/5 cores (80% of tissue), pattern 4, ASAP, perineural invasion, HGPIN    Proc (9/26/23): robotic assisted laparoscopic prostatectomy with bilateral pelvic lymph node dissection   Path: prostatic adenocarcinoma, GG2 (Neil score 3+4=7), 6-10% pattern 4, lymph nodes neg     Buddy Izquierdo is a 64 y.o. male referred by Dr. Walker for elevated PSA. Hx of prostatitis, HTN, UTIs. MRI Prostate (4/3/23) showed PI-RADS 4 lesion in the right posterolateral PZ prostate apex, without evidence of extraprostatic extension. S/p TP prostate biopsy (5/16/23) with pathology showing prostatic adenocarcinoma, GG3 (Neil 4+3=7), 2/2 cores (65% of tissue), LEEANNA #1 GG2 ([place] 3+4=7), 4/5 cores (80% of tissue), pattern 4, ASAP, perineural invasion, HGPIN. S/p biopsy hematuria. PSMA/PET (6/16/23) showed heterogenous Ga68 PSMA uptake throughout the prostate gland with focally increased Ga68 PSMA expression within the apical prostate gland consistent with biopsy-proven prostate cancer, no evidence for pelvic lymph node metastasis, no evidence for distant metastatic disease including osseous metastases. S/p robotic assisted laparoscopic prostatectomy with bilateral pelvic lymph node dissection (9/26/23) with pathology showing prostatic adenocarcinoma, GG2 (Neil score 3+4=7), 6-10% pattern 4, lymph nodes neg. Good urinary function, not wearing pad or diaper. ED able to obtain erections with Cialis.     Hx of ICI use and experienced bruising and pain after injections. Hx of facial blotchy erythema and heartburn with Cialis.      PSA: 0.05 (4/24/25), <0.10 (11/25/24), <0.10 (5/13/24), <0.10 (2/12/24), <0.10 (11/16/23), 5.53 (2/23/23)     MRI Prostate (4/3/23): 18.5g  PI-RADS 4 lesion in the right posterolateral PZ prostate apex, without evidence of extraprostatic extension.      PSMA/PET (6/16/23): heterogenous Ga68 PSMA uptake throughout the prostate gland with focally increased Ga68 PSMA expression within the apical prostate gland consistent with biopsy-proven prostate cancer, no evidence for pelvic lymph node metastasis, no evidence for distant metastatic disease including osseous metastases.    Review of Systems:  All systems reviewed. Anything negative noted in the HPI.    Physical Exam:  Vitals signs reviewed.  Constitutional:      Appearance: Well-developed.  HENT:     Head: Normocephalic and atraumatic.  Neck:     Musculoskeletal: Normal range of motion.  Pulmonary:     Effort: Pulmonary effort is normal.  Musculoskeletal: Normal range of motion.  Skin:     General: Skin is warm and dry.  Neurological:     Mental Status: Alert and oriented to person, place, and time.  Psychiatric:        Behavior: Behavior normal.        Thought Content: Thought content normal.        Judgment: Judgment normal.  Genitourinary:     Penis: Normal.      Scrotum/Testes: Normal.     Comments:  Abdominal:     Palpations: Abdomen is soft. There is no mass.     Tenderness: There is no tenderness. There is no guarding or rebound.     Hernia: No hernia is present.     Comments:     Assessment/Plan   Buddy Izquierdo is a 64 y.o. male referred by Dr. Walker for elevated PSA. Hx of prostatitis, HTN, UTIs. MRI Prostate (4/3/23) showed PI-RADS 4 lesion in the right posterolateral PZ prostate apex, without evidence of extraprostatic extension. S/p TP prostate biopsy (5/16/23) with pathology showing prostatic adenocarcinoma, GG3 ([place] 4+3=7), 2/2 cores (65% of tissue), LEEANNA #1 GG2 ([place] 3+4=7), 4/5 cores (80% of tissue), pattern 4, ASAP, perineural invasion, HGPIN. S/p biopsy hematuria. PSMA/PET (6/16/23) showed heterogenous Ga68 PSMA uptake throughout the prostate gland with focally increased Ga68 PSMA expression within the apical prostate gland consistent with biopsy-proven prostate cancer, no evidence  for pelvic lymph node metastasis, no evidence for distant metastatic disease including osseous metastases. S/p robotic assisted laparoscopic prostatectomy with bilateral pelvic lymph node dissection (9/26/23) with pathology showing prostatic adenocarcinoma, GG2 ([place] score 3+4=7), 6-10% pattern 4, lymph nodes neg. Good urinary function, not wearing pad or diaper. ED able to obtain erections with Cialis. Management options including risks, benefits and alternatives discussed at length and all questions answered. Patient prefers to proceed with follow-up in 6mos with PSA.     Rx Cialis           Scribe Attestation  By signing my name below, INereida Scribe   attest that this documentation has been prepared under the direction and in the presence of Vinicio Robles MD.

## 2025-05-29 ENCOUNTER — APPOINTMENT (OUTPATIENT)
Dept: UROLOGY | Facility: CLINIC | Age: 65
End: 2025-05-29
Payer: COMMERCIAL

## 2025-05-29 VITALS — TEMPERATURE: 97 F

## 2025-05-29 DIAGNOSIS — C61 MALIGNANT NEOPLASM OF PROSTATE (MULTI): Primary | ICD-10-CM

## 2025-05-29 DIAGNOSIS — C61 PROSTATE CANCER (MULTI): ICD-10-CM

## 2025-05-29 PROCEDURE — 99214 OFFICE O/P EST MOD 30 MIN: CPT | Performed by: STUDENT IN AN ORGANIZED HEALTH CARE EDUCATION/TRAINING PROGRAM

## 2025-05-29 RX ORDER — TADALAFIL 5 MG/1
TABLET ORAL
Qty: 90 TABLET | Refills: 3 | Status: SHIPPED | OUTPATIENT
Start: 2025-05-29 | End: 2025-05-30

## 2025-05-29 ASSESSMENT — PAIN SCALES - GENERAL: PAINLEVEL_OUTOF10: 0-NO PAIN

## 2025-05-30 DIAGNOSIS — C61 PROSTATE CANCER (MULTI): ICD-10-CM

## 2025-05-30 RX ORDER — TADALAFIL 5 MG/1
TABLET ORAL
Qty: 90 TABLET | Refills: 3 | Status: SHIPPED | OUTPATIENT
Start: 2025-05-30

## 2025-06-02 DIAGNOSIS — C61 PROSTATE CANCER (MULTI): ICD-10-CM

## 2025-06-02 RX ORDER — TADALAFIL 5 MG/1
TABLET ORAL
Qty: 90 TABLET | Refills: 3 | Status: SHIPPED | OUTPATIENT
Start: 2025-06-02

## 2025-06-04 DIAGNOSIS — I10 ESSENTIAL HYPERTENSION, BENIGN: ICD-10-CM

## 2025-06-07 RX ORDER — LOSARTAN POTASSIUM 50 MG/1
50 TABLET ORAL DAILY
Qty: 90 TABLET | Refills: 3 | Status: SHIPPED | OUTPATIENT
Start: 2025-06-07

## 2025-12-11 ENCOUNTER — APPOINTMENT (OUTPATIENT)
Dept: UROLOGY | Facility: CLINIC | Age: 65
End: 2025-12-11
Payer: COMMERCIAL

## (undated) DEVICE — TUBE SET, PNEUMOCLEAR, SMOKE EVACU, HIGH-FLOW

## (undated) DEVICE — ABSORBATACK, 5MM, SINGLE USE/W 15 ABSORBABLE TACKS

## (undated) DEVICE — TROCAR, BLUNTPORT,  W/THREADED ANCHOR, 12MM, LF

## (undated) DEVICE — TOWEL, SURGICAL, NEURO, O/R, 16 X 26, BLUE, STERILE

## (undated) DEVICE — TROCAR, STRUCTURAL BALLOON, SPACEMAKER

## (undated) DEVICE — REST, HEAD, BAGEL, 9 IN

## (undated) DEVICE — COVER, CART, 45 X 27 X 48 IN, CLEAR

## (undated) DEVICE — SUTURE, VICRYL, 4-0, 18 IN, UNDYED BR PS-2

## (undated) DEVICE — SUTURE, VICRYL, 0, 27 IN, UR-6, VIOLET

## (undated) DEVICE — Device

## (undated) DEVICE — SCISSORS, EPIX, LAPOROSCOPIC, 5MM X 35CM

## (undated) DEVICE — SUTURE, PROLENE, 2-0, 36 IN, SH, DA, BLUE

## (undated) DEVICE — ENDO, PORT VERSASTEP 5MM

## (undated) DEVICE — NEEDLE, INSUFFLATION, 14 G, 100 MM